# Patient Record
Sex: MALE | Race: WHITE | NOT HISPANIC OR LATINO | Employment: FULL TIME | URBAN - METROPOLITAN AREA
[De-identification: names, ages, dates, MRNs, and addresses within clinical notes are randomized per-mention and may not be internally consistent; named-entity substitution may affect disease eponyms.]

---

## 2023-03-08 LAB
CHOLESTEROL (MG/DL) IN SER/PLAS: 157 MG/DL (ref 0–199)
HEPATITIS B VIRUS SURFACE AB (MIU/ML) IN SERUM: 6.4 MIU/ML
HEPATITIS C VIRUS AB PRESENCE IN SERUM: NONREACTIVE
HIV 1/ 2 AG/AB SCREEN: NONREACTIVE

## 2023-07-07 ENCOUNTER — APPOINTMENT (OUTPATIENT)
Dept: LAB | Facility: LAB | Age: 58
End: 2023-07-07
Payer: COMMERCIAL

## 2023-07-07 LAB
ANION GAP IN SER/PLAS: 10 MMOL/L (ref 10–20)
BASOPHILS (10*3/UL) IN BLOOD BY AUTOMATED COUNT: 0.04 X10E9/L (ref 0–0.1)
BASOPHILS/100 LEUKOCYTES IN BLOOD BY AUTOMATED COUNT: 0.8 % (ref 0–2)
CALCIUM (MG/DL) IN SER/PLAS: 9.1 MG/DL (ref 8.6–10.6)
CARBON DIOXIDE, TOTAL (MMOL/L) IN SER/PLAS: 29 MMOL/L (ref 21–32)
CHLORIDE (MMOL/L) IN SER/PLAS: 107 MMOL/L (ref 98–107)
CREATININE (MG/DL) IN SER/PLAS: 0.98 MG/DL (ref 0.5–1.3)
EOSINOPHILS (10*3/UL) IN BLOOD BY AUTOMATED COUNT: 0.08 X10E9/L (ref 0–0.7)
EOSINOPHILS/100 LEUKOCYTES IN BLOOD BY AUTOMATED COUNT: 1.5 % (ref 0–6)
ERYTHROCYTE DISTRIBUTION WIDTH (RATIO) BY AUTOMATED COUNT: 14.5 % (ref 11.5–14.5)
ERYTHROCYTE MEAN CORPUSCULAR HEMOGLOBIN CONCENTRATION (G/DL) BY AUTOMATED: 32.5 G/DL (ref 32–36)
ERYTHROCYTE MEAN CORPUSCULAR VOLUME (FL) BY AUTOMATED COUNT: 91 FL (ref 80–100)
ERYTHROCYTES (10*6/UL) IN BLOOD BY AUTOMATED COUNT: 4.47 X10E12/L (ref 4.5–5.9)
GFR MALE: 89 ML/MIN/1.73M2
GLUCOSE (MG/DL) IN SER/PLAS: 102 MG/DL (ref 74–99)
HEMATOCRIT (%) IN BLOOD BY AUTOMATED COUNT: 40.9 % (ref 41–52)
HEMOGLOBIN (G/DL) IN BLOOD: 13.3 G/DL (ref 13.5–17.5)
IMMATURE GRANULOCYTES/100 LEUKOCYTES IN BLOOD BY AUTOMATED COUNT: 0.2 % (ref 0–0.9)
INR IN PPP BY COAGULATION ASSAY: 0.9 (ref 0.9–1.1)
LEUKOCYTES (10*3/UL) IN BLOOD BY AUTOMATED COUNT: 5.3 X10E9/L (ref 4.4–11.3)
LYMPHOCYTES (10*3/UL) IN BLOOD BY AUTOMATED COUNT: 1.31 X10E9/L (ref 1.2–4.8)
LYMPHOCYTES/100 LEUKOCYTES IN BLOOD BY AUTOMATED COUNT: 24.6 % (ref 13–44)
MONOCYTES (10*3/UL) IN BLOOD BY AUTOMATED COUNT: 0.56 X10E9/L (ref 0.1–1)
MONOCYTES/100 LEUKOCYTES IN BLOOD BY AUTOMATED COUNT: 10.5 % (ref 2–10)
NEUTROPHILS (10*3/UL) IN BLOOD BY AUTOMATED COUNT: 3.32 X10E9/L (ref 1.2–7.7)
NEUTROPHILS/100 LEUKOCYTES IN BLOOD BY AUTOMATED COUNT: 62.4 % (ref 40–80)
NRBC (PER 100 WBCS) BY AUTOMATED COUNT: 0 /100 WBC (ref 0–0)
PLATELETS (10*3/UL) IN BLOOD AUTOMATED COUNT: 182 X10E9/L (ref 150–450)
POTASSIUM (MMOL/L) IN SER/PLAS: 4.1 MMOL/L (ref 3.5–5.3)
PROTHROMBIN TIME (PT) IN PPP BY COAGULATION ASSAY: 10.1 SEC (ref 9.8–12.8)
SODIUM (MMOL/L) IN SER/PLAS: 142 MMOL/L (ref 136–145)
UREA NITROGEN (MG/DL) IN SER/PLAS: 17 MG/DL (ref 6–23)

## 2023-07-11 ENCOUNTER — HOSPITAL ENCOUNTER (OUTPATIENT)
Dept: DATA CONVERSION | Facility: HOSPITAL | Age: 58
End: 2023-07-11
Attending: ORTHOPAEDIC SURGERY | Admitting: ORTHOPAEDIC SURGERY
Payer: COMMERCIAL

## 2023-07-11 DIAGNOSIS — S52.021A DISPLACED FRACTURE OF OLECRANON PROCESS WITHOUT INTRAARTICULAR EXTENSION OF RIGHT ULNA, INITIAL ENCOUNTER FOR CLOSED FRACTURE: ICD-10-CM

## 2023-08-21 ENCOUNTER — HOSPITAL ENCOUNTER (OUTPATIENT)
Dept: DATA CONVERSION | Facility: HOSPITAL | Age: 58
End: 2023-08-21

## 2023-09-06 ENCOUNTER — HOSPITAL ENCOUNTER (OUTPATIENT)
Dept: DATA CONVERSION | Facility: HOSPITAL | Age: 58
End: 2023-09-06
Attending: SURGERY | Admitting: SURGERY
Payer: COMMERCIAL

## 2023-09-06 DIAGNOSIS — K40.90 UNILATERAL INGUINAL HERNIA, WITHOUT OBSTRUCTION OR GANGRENE, NOT SPECIFIED AS RECURRENT: ICD-10-CM

## 2023-09-26 ENCOUNTER — HOSPITAL ENCOUNTER (OUTPATIENT)
Dept: DATA CONVERSION | Facility: HOSPITAL | Age: 58
End: 2023-09-26
Payer: COMMERCIAL

## 2023-09-29 VITALS — BODY MASS INDEX: 28.02 KG/M2 | WEIGHT: 189.15 LBS | HEIGHT: 69 IN

## 2023-09-29 VITALS — WEIGHT: 188.49 LBS | BODY MASS INDEX: 27.92 KG/M2 | HEIGHT: 69 IN

## 2023-09-30 NOTE — H&P
History & Physical Reviewed:   I have reviewed the History and Physical dated:  08-Aug-2023   History and Physical reviewed and relevant findings noted. Patient examined to review pertinent physical  findings.: No significant changes   Home Medications Reviewed: no changes noted   Allergies Reviewed: no changes noted       ERAS (Enhanced Recovery After Surgery):  ·  ERAS Patient: no     Consent:   COVID-19 Consent:  ·  COVID-19 Risk Consent Surgeon has reviewed key risks related to the risk of sai COVID-19 and if they contract COVID-19 what the risks are.       Electronic Signatures:  Evita Johnson)  (Signed 06-Sep-2023 10:13)   Authored: History & Physical Reviewed, ERAS, Consent,  Note Completion      Last Updated: 06-Sep-2023 10:13 by Evita Johnson)

## 2023-09-30 NOTE — H&P
History & Physical Reviewed:   I have reviewed the History and Physical dated:  11-Jul-2023   History and Physical reviewed and relevant findings noted. Patient examined to review pertinent physical  findings.: No significant changes   Home Medications Reviewed: no changes noted   Allergies Reviewed: no changes noted       ERAS (Enhanced Recovery After Surgery):  ·  ERAS Patient: no     Consent:   COVID-19 Consent:  ·  COVID-19 Risk Consent Surgeon has reviewed key risks related to the risk of sai COVID-19 and if they contract COVID-19 what the risks are.     Attestation:   Note Completion:  I am a:  Resident/Fellow   Attending Attestation I saw and evaluated the patient.  I personally obtained the key and critical portions of the history and physical exam or was physically present for key and  critical portions performed by the resident/fellow. I reviewed the resident/fellow?s documentation and discussed the patient with the resident/fellow.  I agree with the resident/fellow?s medical decision making as documented in the note.     I personally evaluated the patient on 11-Jul-2023         Electronic Signatures:  Rodrigo Lomeli (Resident))  (Signed 11-Jul-2023 10:00)   Authored: History & Physical Reviewed, ERAS, Consent,  Note Completion  Moody Davis)  (Signed 11-Jul-2023 17:32)   Authored: Note Completion   Co-Signer: History & Physical Reviewed, ERAS, Consent, Note Completion      Last Updated: 11-Jul-2023 17:32 by Moody Davis)

## 2023-10-01 NOTE — OP NOTE
Post Operative Note:     PreOp Diagnosis: left inguinal hernia   Post-Procedure Diagnosis: same   Procedure: 1. robot assisted laparoscopic left inguinal  hernia repair   2. TAP block, bilateral   3.   4.   5.   Surgeon: Elizabeth   Resident/Fellow/Other Assistant:    Anesthesia: general   Estimated Blood Loss (mL): 20   Specimen: no   Complications: none   Findings: see below   Patient Returned To/Condition: PACU stable   Additional Details: Informed consent was obtained.  The patient was lying on the operating table supine. Subcutaneous lovenox was given. SCDs were placed. Intravenous antibiotics was given. After general anesthesia was administered, the patient was prepped and draped in the usual sterile fashion.  An epigastric  incision was carried down through the skin and subcutaneous tissue.  A 12 mm Shereen port was placed. Pneumoperitoneum was created by insufflating CO2 with pressure to 15 mmHg. A 8mm robot port was then placed through the Shereen port. Laparoscope was then  inserted.  There was a sizeable indirect left inguinal hernia. Two 8mm ports were placed on either side of the midline under direct vision at the level of camera port. We then docked the robot. The assistant stayed scrubbed at patient side. I then scrubbed  out and seated at the console to proceed with the procedure. The sigmoid colon was adherent to the sac. It was freed from the sac first. The peritoneum overlying the left groin was incised and reflected.  The hernia sac was dissected back from the inguinal  canal without injuring the surrounding structures.  It was reduced back into the preperitoneal space.  The remainder of the preperitoneal space was carefully created laterally to psoas muscle, medially beyond the radha?s ligament passing the level  of midline, inferiorly to the iliac vessels. The vas deferens was parietalized.  An extra large 3DMax Bard light mesh was then introduced through the port into the preperitoneal space and  placed into position nicely to cover the myopectineal orifice completely.  The mesh was secured to radha's ligament and abdominal wall on either side of inferior epigastric vessels with 2-0 vicryl. The peritoneum was closed with V Loc suture.  I then scrubbed back in. TAP block was then performed on both sides with 1% lidocaine  and 0.5% Marcaine 1:1 mixture.  The ports were then removed. The abdomen was deflated. The fascia at the Shereen port was closed with #0 Vicryl.  The subcutaneous tissue at each port site was then injected with 1% Lidocaine and 0.5% Marcaine mixture. The  skin was closed at each site with subcuticular 4-0 Vicryl.  Dermabond was applied.    The patient tolerated the procedure well. All counts were correct.     Both testicles were pulled down to scrotum at the conclusion of surgery.       Electronic Signatures:  Evita Johnson)  (Signed 06-Sep-2023 13:12)   Authored: Post Operative Note, Note Completion      Last Updated: 06-Sep-2023 13:12 by Evita Johnson)

## 2023-10-02 NOTE — OP NOTE
PROCEDURE DETAILS    Preoperative Diagnosis:  Closed, NVI R olecranon fracture  Postoperative Diagnosis:  Closed, NVI R olecranon fracture  Surgeon: Dr. Moody Davis  Resident/Fellow/Other Assistant: Niraj Granado, PGY5; Rodrigo Lomeli, PGY1    Procedure:  1. ORIF R olecranon with 22 modifier for increased case complexity  Anesthesia: GETA  Estimated Blood Loss: 50 ccs  Blood Replaced: None  Findings: Consistent with preoperative diagnosis  Specimens(s) Collected: no,     Complications: None  Drains and/or Catheters: None  Implants: Right synthes proximal ulna LCDC plate; 2.0 minifrag plate  Tourniquet Times: < 120 mins  Additional Details:   - NWB RUE in splint and sling  - Pain meds  - Bowel regimen  - Gradually restart home eliquis on POD1  - Discharge to home from PACU  - Follow up with Dr. Davis as scheduled in ~2 weeks  Patient Returned To/Condition: PACU in good condition        Operative Report:   INDICATIONS:  This is a left-handed  57-year-old male who presented to clinic with a closed NVI R olecranon fracture. Given the fracture's displacement, he was offered surgery in the form of open reduction internal fixation in order to promote healing of the fracture in appropriate alignment,  reduce the risk of arthritis and nonunion, and restore his extensor mechanism. The risks, benefits, and alternatives to surgery (including nonoperative management) were discussed at length with the patient, including but not limited to pain, infection,  bleeding, malunion, nonunion, implant issues, wound issues, stiffness, damage to surrounding structures, and the general risks of anaesthesia. The patient verbalized their understanding of these and confirmed their explicit desire to proceed. An informed  consent form was signed. The operative limb was marked.    Of note, this case had increased complexity due to his delayed presentation (~3 weeks after injury), noncompliance with preoperative immobilization, and the presence  of a loose osteochondral fragment on the ulnar border of the articular surface identified  intraoperatively.    PROCEDURE:  The patient was met in preop and agreeable to proceed. A preop huddle was performed. The patient was transported to the OR. A timeout procedure was performed. The patient, laterality, and procedure were again confirmed using two unique identifiers as  well as his preop imaging. All team members were in agreement.     GETA was induced uneventfully. IV abx were administered. The patient was transported to an OR table in the lateral position on a bean bag. All bony prominences were well padded. An axillary roll and bone foam were placed. The limb was prepped and draped  in typical fashion. A preop pause was performed.    A 10cm curvilinear incision was made over the posterior distal humerus and proximal ulna, curving toward the radius to avoid the direct tip of the olecranon skin and bursa. Skin bleeders were cauterized. The fracture hematoma was evacuated. We used electrocautery  to gently elevate the proximal flexor and extensor surfaces off the proximal ulna to allow for fixation. There was a nearly transverse oblique fracture through the olecranon. The anteromedial facet was intact, but there was a small 1cm x 5mm piece of  comminution off the ulnar aspect of the proximal fracture fragment. The fracture edges were cleaned with blunt dissection, a wood handle elevator, a pituitary, and a rongeur. There was an excellent read of the dorsal cortex. We therefore reduced the dorsal  cortex using two pointed reduction clamps, provisionally stabilized it with two K wires, and placed two 2.0 Synthes minifragment plates: one to capture the ulnar-sided comminution fragment, and one to stabilize the main fracture line. Although we were  happy with the cortical read there was still some gapping noted of the articular side of the fracture on fluoroscopy. We therefore decided to proceed with plate fixation  rather than a long intramedullary screw as planned.    One of the minifrag plates was removed. The tranverse plate was cut to be two-hole only, for stabilization of the comminuted segment. A lag screw was placed perpendicular to the fracture through the proximalmost aspect of the olecranon. The articular  side of the fracture was noted to compress some on fluoroscopic view. We selected and placed a Synthes LCDC right proximal ulnar plate. A longitudinal rent was made in the triceps tendon to allow the plate to sit on the bone. The plate was pinned in place  and compressed through the plate distally. We placed metaphyseal screws proximally to further reduce the plate to the bone. We then placed an additional compression screw in the distalmost hole. The cortical read dorsally was reconfirmed to be excellent  reduced. We then proceeded to place additional locking screws both proximally and distally.    At this point we were very satisfied with our alignment and fixation. Final fluoroscopy was saved. The elbow was taken through a full range of motion and noted to have no blocks to motion.    Being satisfied with our fixation, we irrigated all wounds and applied antibiotic powder. Hemostasis was confirmed. The distal musculature was closed over the plate using #1 vicryl. We then also used #1 vicryl to repair the rent in the triceps fascia.  This suture was passed through the plate and tied over the plate. We closed the skin with 2-0 vicryl and 2-0 nylon. A dry sterile dressing was applied. A long arm splint and sling were applied. GETA was reversed uneventfully.    POSTOP PLAN:  The patient was transported to PACU in a hospital bed in good condition. He will be NWB on his RUE. He will receive 6 weeks of Oral Vitamin D / Calcium and slowly restart his home eliquis. He will follow up with Dr. Davis in 2 weeks for wound check and  repeat films 3 views R elbow out of splint.      Dr. Moody Davis was present for the  critical portions of the procedure      Niraj Granado MD, dictating on behalf of Dr. Davis                        Attestation:   Note Completion:  Attending Attestation I was present for key portions of the procedure and the procedure lasted longer than 5 minutes.    I am a: Resident/Fellow         Electronic Signatures:  Niraj Granado (MD (Resident))  (Signed 11-Jul-2023 16:36)   Authored: Post-Operative Note, Chart Review, Note Completion  Moody Davis)  (Signed 11-Jul-2023 17:34)   Authored: Note Completion   Co-Signer: Post-Operative Note, Chart Review, Note Completion      Last Updated: 11-Jul-2023 17:34 by Moody Davis)

## 2023-10-03 ENCOUNTER — APPOINTMENT (OUTPATIENT)
Dept: OCCUPATIONAL THERAPY | Facility: CLINIC | Age: 58
End: 2023-10-03
Payer: COMMERCIAL

## 2023-10-05 ENCOUNTER — APPOINTMENT (OUTPATIENT)
Dept: OCCUPATIONAL THERAPY | Facility: CLINIC | Age: 58
End: 2023-10-05
Payer: COMMERCIAL

## 2023-10-23 PROBLEM — K40.90 LEFT INGUINAL HERNIA: Status: ACTIVE | Noted: 2023-10-23

## 2023-10-23 PROBLEM — I48.91 ATRIAL FIBRILLATION (MULTI): Status: ACTIVE | Noted: 2023-10-23

## 2023-10-23 PROBLEM — R29.818 SUSPECTED SLEEP APNEA: Status: ACTIVE | Noted: 2023-10-23

## 2023-10-23 RX ORDER — FLUOXETINE HYDROCHLORIDE 20 MG/1
20 CAPSULE ORAL DAILY
COMMUNITY
Start: 2023-03-17

## 2023-10-23 RX ORDER — METOPROLOL SUCCINATE 25 MG/1
25 TABLET, EXTENDED RELEASE ORAL DAILY
COMMUNITY
Start: 2023-08-10 | End: 2023-10-30 | Stop reason: SDUPTHER

## 2023-10-23 RX ORDER — MELOXICAM 15 MG/1
15 TABLET ORAL DAILY
COMMUNITY
Start: 2015-02-11

## 2023-10-23 RX ORDER — DILTIAZEM HYDROCHLORIDE 120 MG/1
120 TABLET, FILM COATED ORAL DAILY
COMMUNITY
Start: 2023-03-15

## 2023-10-23 RX ORDER — ESCITALOPRAM OXALATE 5 MG/1
5 TABLET ORAL DAILY
COMMUNITY
Start: 2023-04-06 | End: 2023-05-06 | Stop reason: WASHOUT

## 2023-10-23 RX ORDER — APIXABAN 5 MG/1
TABLET, FILM COATED ORAL
COMMUNITY
Start: 2023-07-28 | End: 2024-03-12 | Stop reason: SDUPTHER

## 2023-10-25 ENCOUNTER — CLINICAL SUPPORT (OUTPATIENT)
Dept: SLEEP MEDICINE | Facility: CLINIC | Age: 58
End: 2023-10-25
Payer: COMMERCIAL

## 2023-10-25 VITALS
HEIGHT: 69 IN | SYSTOLIC BLOOD PRESSURE: 117 MMHG | OXYGEN SATURATION: 100 % | RESPIRATION RATE: 22 BRPM | DIASTOLIC BLOOD PRESSURE: 78 MMHG | BODY MASS INDEX: 28.08 KG/M2 | WEIGHT: 189.6 LBS

## 2023-10-25 DIAGNOSIS — G47.31 PRIMARY CENTRAL SLEEP APNEA: ICD-10-CM

## 2023-10-25 DIAGNOSIS — G47.33 OBSTRUCTIVE SLEEP APNEA (ADULT) (PEDIATRIC): ICD-10-CM

## 2023-10-25 PROCEDURE — 95811 POLYSOM 6/>YRS CPAP 4/> PARM: CPT | Performed by: INTERNAL MEDICINE

## 2023-10-26 NOTE — PROGRESS NOTES
[facility] TECH NOTE:     Patient: Miguel Cordova   MRN//AGE: [id number]  1965  57 y.o.   Technologist: Antonio Mata   Room: 3   Service Date: 10/25/2023        Sleep Testing Location: [facility]  [address]ck [address] 42cm  [place]: 9  TECHNOLOGIST SLEEP STUDY PROCEDURE NOTE:   This sleep study is being conducted according to the policies and procedures outlined by the AAS accreditation standards.  The sleep study procedure and processes involved during this appointment was explained to the patient/patient’s family, questions were answered. The patient/family verbalized understanding.      The patient is a 57 y.o. year old male scheduled for a Diagnostic PSG Split night with montage of:  default . he arrived for his appointment.      The study that was ultimately completed was a Diagnostic PSG Split night with montage of:  default .    The full study Was completed.  Patient questionnaires completed?: yes     Consents signed? yes    Initial Fall Risk Screening:     Miguel has fallen in the last 6 months. his did not result in injury. Miguel does not have a fear of falling. He does not need assistance with sitting, standing, or walking. he does not need assistance walking in his home. he does not need assistance in an unfamiliar setting. The patient is notusing an assistive device.     Brief Study observations: This is a 57 year-old male here for a PSG-Split Night Study.  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 CPAP was initiate at 2351 EP#342. The patient did meet split-night criteria of AHI > 15 with at least two hours of sleep prior to 0300.  Bi-PAP was imitated at 0336 EP#772 due to the preponderance of central events. A chin strap was placed on the patient due to open mouth breathing/leak. The patient could benefit from the use of a full face mask due to open mouth breathing/leak even with a chin strap.    Deviation to order/protocol and reason: no      If  PAP, which was preferred mask/pressure/mode: Toñito Erickson under the nose nasal mask size medium      Other: no    After the procedure, the patient/family was informed to ensure followup with ordering clinician for testing results.      Technologist: Antonio Mata

## 2023-10-27 ENCOUNTER — TELEPHONE (OUTPATIENT)
Dept: CARDIOLOGY | Facility: CLINIC | Age: 58
End: 2023-10-27
Payer: COMMERCIAL

## 2023-10-27 DIAGNOSIS — G47.33 OBSTRUCTIVE SLEEP APNEA SYNDROME, SEVERE: Primary | ICD-10-CM

## 2023-10-27 NOTE — TELEPHONE ENCOUNTER
Patient called back regarding sleep study results. I clarified the number that he needed to call to be scheduled for sleep medicine. I also reminded him of his follow up visit with Tamika. Pt verbalized understanding.

## 2023-10-27 NOTE — TELEPHONE ENCOUNTER
Left message for patient letting him know to schedule with sleep medicine ASAP. I also reminded him to attend his follow up with Tamika on 11/16/23 @ 11Am

## 2023-10-27 NOTE — TELEPHONE ENCOUNTER
----- Message from SELWYN Bustos sent at 10/27/2023  1:28 PM EDT -----  Please have patient call and schedule with sleep medicine ASAP [phone] and can you make sure he has a follow up with me?    I will put referral in for sleep medicine.

## 2023-10-30 ENCOUNTER — OFFICE VISIT (OUTPATIENT)
Dept: SLEEP MEDICINE | Facility: CLINIC | Age: 58
End: 2023-10-30
Payer: COMMERCIAL

## 2023-10-30 VITALS
OXYGEN SATURATION: 94 % | DIASTOLIC BLOOD PRESSURE: 76 MMHG | SYSTOLIC BLOOD PRESSURE: 130 MMHG | HEIGHT: 69 IN | BODY MASS INDEX: 27.4 KG/M2 | WEIGHT: 185 LBS | HEART RATE: 70 BPM

## 2023-10-30 DIAGNOSIS — I48.0 PAROXYSMAL ATRIAL FIBRILLATION (MULTI): Primary | ICD-10-CM

## 2023-10-30 DIAGNOSIS — I10 ESSENTIAL (PRIMARY) HYPERTENSION: ICD-10-CM

## 2023-10-30 DIAGNOSIS — I48.0 PAROXYSMAL ATRIAL FIBRILLATION (MULTI): ICD-10-CM

## 2023-10-30 DIAGNOSIS — G47.33 OBSTRUCTIVE SLEEP APNEA SYNDROME, SEVERE: ICD-10-CM

## 2023-10-30 DIAGNOSIS — G47.31 CENTRAL SLEEP APNEA: Primary | ICD-10-CM

## 2023-10-30 PROCEDURE — 3075F SYST BP GE 130 - 139MM HG: CPT | Performed by: PHYSICIAN ASSISTANT

## 2023-10-30 PROCEDURE — 3078F DIAST BP <80 MM HG: CPT | Performed by: PHYSICIAN ASSISTANT

## 2023-10-30 PROCEDURE — 99204 OFFICE O/P NEW MOD 45 MIN: CPT | Performed by: PHYSICIAN ASSISTANT

## 2023-10-30 PROCEDURE — 1036F TOBACCO NON-USER: CPT | Performed by: PHYSICIAN ASSISTANT

## 2023-10-30 NOTE — PATIENT INSTRUCTIONS
"  Thank you for coming to the Sleep Medicine Clinic today! Your sleep medicine provider today was: Juan Diego Suh PA-C Below is a summary of your treatment plan, other important information, and our contact numbers:      TREATMENT PLAN     Ordering the machine for you from [facility]    Follow-up Appointment:   31-90 days after starting machine      IMPORTANT PHONE NUMBERS     Sleep Medicine Clinic Fax: [phone]  Appointments (for Adult Sleep Clinic): [phone] - option 2  Appointments (For Sleep Studies): [phone] - option 3  Behavioral Sleep Medicine: [phone]    [employer] ([place]): [phone]  For clinical questions and refilling prescriptions: [phone]  Joan Ray (For Bab[place]/Angeli): P: [phone]  F: [phone]       CONTACTING YOUR SLEEP MEDICINE PROVIDER     Send a message directly to your provider through \"My Chart\", which is the email service through your  Records Account: [email or web address]   Call [phone] and leave a message. One of the administrative assistants will forward the message to your sleep medicine provider through \"My Chart\" and/or email.     Your sleep medicine provider for this visit was: Juan Diego Suh PA-C       "

## 2023-10-30 NOTE — PROGRESS NOTES
[facility]  New Visit Note        HISTORY OF PRESENT ILLNESS     The patient's referring provider is: Tamika Jackson, APR*    HISTORY OF PRESENT ILLNESS   Miguel Cordova is a 57 y.o. male who presents to a [facility] for a sleep medicine evaluation with concerns of Sleep Apnea and Review Sleep Study Results.     PAST SLEEP HISTORY    Patient has the following sleep-related diagnoses: obstructive/central sleep apnea  Prior sleep study results: severe sleep apnea with obstructive and central events - AHI 68.9 KARLO 20    CURRENT HISTORY    On today's visit, the patient reports symptoms of snoring, stopping breathing at night, waking from sleep. He has not had testing for ANIKA prior to most recent 10/25/2023 test.    10/25/2023 test showed severe sleep apnea with both obstructive and central sleep apnea events. On CPAP up to pressure of 10 cm H2O and was changed to BiPAP ST without resolution of central apnea events. EF was 65-70% according to echocardiogram 8/16/2023.    Has had ANIKA symptoms for a long time.    Afib started at his sons wedding several weeks ago. He is no longer drinking caffeine. He thinks anxiety may have triggered Afib.    He is on metoprolol for Afib and HTN.    STOP  4  BANG 2  ESS     7    Sleep schedule   Usually goes to sleep after midnight - no further information provided    Naps:   rarely naps    Preferred sleeping position: supine    Sleep-related ROS:    Sleep Initiation: no problems going to sleep  Problems going to sleep associated with: No problems going to sleep    Sleep Maintenance: easily returns to sleep after awakening and wakes up about several times per night  Problems staying asleep associated with: Problems Staying Asleep: no clear reason    Breathing during sleep: snoring and witnessed apneas    RLS screen:  denies    Sleep-related behaviors:  denies    Daytime Symptoms  On awakening patient reports: rarely  "headaches and sore throat    Patient report some daytime symptoms including: daytime sleepiness    ESS: No data recorded   JOSE: 12  FOSQ:  35      REVIEW OF SYSTEMS     All other systems negative      ALLERGIES AND MEDICATIONS     ALLERGIES  No Known Allergies    MEDICATIONS  Current Outpatient Medications   Medication Sig Dispense Refill    acetaminophen (TYLENOL 8 HOUR ORAL) Take by mouth if needed.      cyclobenzaprine (Flexeril) 5 mg tablet TAKE 1 TABLET BY MOUTH THREE TIMES A DAY 15 tablet 0    dilTIAZem (Cardizem) 120 mg immediate release tablet Take 1 tablet (120 mg) by mouth once daily. as directed      docusate sodium (Colace) 100 mg capsule TAKE 1 CAPSULE BY MOUTH TWO TIMES A DAY. HOLD FOR LOOSE STOOL 10 capsule 1    Eliquis 5 mg tablet       FLUoxetine (PROzac) 20 mg capsule Take 1 capsule (20 mg) by mouth once daily.      HYDROcodone-acetaminophen (Norco) 5-325 mg tablet TAKE 1 TABLET BY MOUTH EVERY 4 HOURS FOR 3 DAYS AS NEEDED FOR PAIN 18 tablet 0    meloxicam (Mobic) 15 mg tablet Take 1 tablet (15 mg) by mouth once daily.      metoprolol succinate XL (Toprol-XL) 25 mg 24 hr tablet Take 1 tablet (25 mg) by mouth once daily.       No current facility-administered medications for this visit.         PAST HISTORY     PAST MEDICAL HISTORY  He  has no past medical history on file.    PAST SURGICAL HISTORY:  History reviewed. No pertinent surgical history.    FAMILY HISTORY  No family history on file.    SOCIAL HISTORY  He  reports that he has never smoked. He has never used smokeless tobacco. He reports that he does not currently use alcohol. He reports that he does not use drugs. He currently lives alone.     Caffeine consumption:  none  Alcohol consumption: 3 times per week  Smoking: never  Marijuana: yes      PHYSICAL EXAM     VITAL SIGNS: /76   Pulse 70   Ht 1.753 m (5' 9\")   Wt 83.9 kg (185 lb)   SpO2 94%   BMI 27.32 kg/m²      CURRENT WEIGHT: [unfilled]  BMI: [unfilled]  PREVIOUS WEIGHTS:  Wt " Readings from Last 3 Encounters:   10/30/23 83.9 kg (185 lb)   10/25/23 86 kg (189 lb 9.5 oz)       PHYSICAL EXAM: GENERAL: alert oriented x 3 pleasant and cooperative no acute distress  MODIFIED WEBSTER SCORE: 1+  MODIFIED MALLAMPATI SCORE: 1+  LATERAL PHARYNGEAL WALL: 2+  TONSILLAR SCORE: 1+  OVERJET NOTED  UVULA: midline  TONGUE SCALLOPING: normal  NECK EXAM: normal supple no adenopathy      ASSESSMENT/PLAN     Mr. Cordova is a 57 y.o. male and He was referred to the [facility] for the following issues:    OBSTRUCTIVE & CENTRAL SLEEP APNEA, SEVERE  -Reviewed test results with patient in detail  -recommended titration with ASV but he wishes to try machine first  -Will order new ASV from [facility] with EPAP 5-15, PS 0-10 (echocardiogram 8/2023 showed normal EF)  -Discussed mask options and common tolerance issues - wants to start with P10 mask  -Goal of CPAP includes less daytime sleepiness, less waking, lower risk of Afib, reducing BP  -Followup 31-90 days after starting CPAP    HYPERTENSION  -/76 today  -Will likely benefit from positive airway pressure    AFIB  -Followed by cardiology  -Discussed treating ANIKA/CSA will reduce risk of afib    Followup 31-90 days after starting CPAP

## 2023-11-01 RX ORDER — METOPROLOL SUCCINATE 25 MG/1
25 TABLET, EXTENDED RELEASE ORAL DAILY
Qty: 90 TABLET | Refills: 3 | Status: SHIPPED | OUTPATIENT
Start: 2023-11-01 | End: 2024-03-12 | Stop reason: SDUPTHER

## 2023-11-15 ENCOUNTER — APPOINTMENT (OUTPATIENT)
Dept: ORTHOPEDIC SURGERY | Facility: CLINIC | Age: 58
End: 2023-11-15
Payer: COMMERCIAL

## 2023-11-15 DIAGNOSIS — G47.31 CENTRAL SLEEP APNEA: Primary | ICD-10-CM

## 2023-11-15 DIAGNOSIS — G47.33 OBSTRUCTIVE SLEEP APNEA: ICD-10-CM

## 2023-11-16 ENCOUNTER — APPOINTMENT (OUTPATIENT)
Dept: CARDIOLOGY | Facility: CLINIC | Age: 58
End: 2023-11-16
Payer: COMMERCIAL

## 2024-03-12 DIAGNOSIS — I48.0 PAROXYSMAL ATRIAL FIBRILLATION (MULTI): Primary | ICD-10-CM

## 2024-03-13 RX ORDER — METOPROLOL SUCCINATE 25 MG/1
25 TABLET, EXTENDED RELEASE ORAL DAILY
Qty: 90 TABLET | Refills: 3 | Status: SHIPPED | OUTPATIENT
Start: 2024-03-13 | End: 2024-05-14 | Stop reason: DRUGHIGH

## 2024-03-13 RX ORDER — APIXABAN 5 MG/1
5 TABLET, FILM COATED ORAL 2 TIMES DAILY
Qty: 90 TABLET | Refills: 3 | Status: SHIPPED | OUTPATIENT
Start: 2024-03-13 | End: 2024-05-28 | Stop reason: SDUPTHER

## 2024-04-02 ENCOUNTER — ANCILLARY PROCEDURE (OUTPATIENT)
Dept: CARDIOLOGY | Facility: CLINIC | Age: 59
End: 2024-04-02
Payer: COMMERCIAL

## 2024-04-02 ENCOUNTER — OFFICE VISIT (OUTPATIENT)
Dept: CARDIOLOGY | Facility: CLINIC | Age: 59
End: 2024-04-02
Payer: COMMERCIAL

## 2024-04-02 VITALS
DIASTOLIC BLOOD PRESSURE: 68 MMHG | HEART RATE: 74 BPM | HEIGHT: 69 IN | BODY MASS INDEX: 28.44 KG/M2 | WEIGHT: 192 LBS | OXYGEN SATURATION: 96 % | SYSTOLIC BLOOD PRESSURE: 118 MMHG

## 2024-04-02 DIAGNOSIS — I48.91 ATRIAL FIBRILLATION, UNSPECIFIED TYPE (MULTI): ICD-10-CM

## 2024-04-02 DIAGNOSIS — I48.91 ATRIAL FIBRILLATION, UNSPECIFIED TYPE (MULTI): Primary | ICD-10-CM

## 2024-04-02 PROCEDURE — 1036F TOBACCO NON-USER: CPT

## 2024-04-02 PROCEDURE — 99214 OFFICE O/P EST MOD 30 MIN: CPT

## 2024-04-02 PROCEDURE — 93248 EXT ECG>7D<15D REV&INTERPJ: CPT | Performed by: INTERNAL MEDICINE

## 2024-04-02 PROCEDURE — 93246 EXT ECG>7D<15D RECORDING: CPT

## 2024-04-02 NOTE — PROGRESS NOTES
"Chief Complaint: FUV    HPI:  57 year old male  with past history of ADHD, new onset atrial fibrillation 4/2023, ANIKA (CPAP),  occasional marijuana smoker .    Mr. Cordova was evaluated 8/18/2023 for new onset atrial fibrillation. He was ordered a Holter Monitor and wore it for 2 weeks . However, never mailed it back.  Recently he has noticed \" heart going out of rhythm\" particularly when he drinks ETOH and when drinks caffeine. It is associated with SOB. He also has noticed significant fatigue.   Patient denies chest pain and angina.  Pt denies shortness of breath, dyspnea on exertion, orthopnea, and paroxysmal nocturnal dyspnea.  Pt denies worsening lower extremity edema.  Pt denies palpitations or syncope.  No recent falls.  No fever or chills.  No cough.  No change in bowel or bladder habits.  No sick contacts.  No recent travel.    Past Medical History:  As above.    Past Surgical History:  Hernia repair 2023      Social History:  He reports that he has never smoked. He has never used smokeless tobacco. He reports that he does not currently use alcohol. He reports current drug use. Drug: Marijuana.    Family History:  Cousin heart disease, father heart disease (heavy smoker).  Allergies:  Patient has no known allergies.    Outpatient Medications:  Current Outpatient Medications   Medication Instructions    acetaminophen (TYLENOL 8 HOUR ORAL) oral, As needed    cyclobenzaprine (Flexeril) 5 mg tablet TAKE 1 TABLET BY MOUTH THREE TIMES A DAY    dilTIAZem (CARDIZEM) 120 mg, oral, Daily, as directed    docusate sodium (Colace) 100 mg capsule TAKE 1 CAPSULE BY MOUTH TWO TIMES A DAY. HOLD FOR LOOSE STOOL    Eliquis 5 mg, oral, 2 times daily    FLUoxetine (PROZAC) 20 mg, oral, Daily    meloxicam (MOBIC) 15 mg, oral, Daily    metoprolol succinate XL (TOPROL-XL) 25 mg, oral, Daily       Physical Exam  Constitutional:       Appearance: Normal appearance.   Neck:      Vascular: No carotid bruit. "   Cardiovascular:      Rate and Rhythm: Normal rate and regular rhythm.      Pulses: Normal pulses.      Heart sounds: Normal heart sounds.      No gallop.   Pulmonary:      Effort: Pulmonary effort is normal.      Breath sounds: Normal breath sounds.   Abdominal:      Palpations: Abdomen is soft.   Musculoskeletal:      Cervical back: Neck supple.   Skin:     General: Skin is warm.      Capillary Refill: Capillary refill takes less than 2 seconds.   Neurological:      General: No focal deficit present.      Mental Status: He is alert and oriented to person, place, and time.   Psychiatric:         Mood and Affect: Mood normal.        Last Labs:  CBC -  Lab Results   Component Value Date    WBC 5.0 08/31/2023    HGB 15.3 08/31/2023    HCT 46.8 08/31/2023    MCV 91 08/31/2023     08/31/2023       CMP -  Lab Results   Component Value Date    CALCIUM 9.6 08/31/2023    PROT 7.2 02/22/2023    ALBUMIN 4.4 02/22/2023    AST 25 02/22/2023    ALT 25 02/22/2023    ALKPHOS 82 02/22/2023    BILITOT 0.4 02/22/2023       LIPID PANEL -   Lab Results   Component Value Date    CHOL 157 03/08/2023       RENAL FUNCTION PANEL -   Lab Results   Component Value Date    GLUCOSE 83 08/31/2023     08/31/2023    K 4.1 08/31/2023     08/31/2023    CO2 29 08/31/2023    ANIONGAP 11 08/31/2023    BUN 17 08/31/2023    CREATININE 0.93 08/31/2023    GFRMALE >90 08/31/2023    CALCIUM 9.6 08/31/2023    ALBUMIN 4.4 02/22/2023        Lab Results   Component Value Date    BNP 28 02/22/2023       Last Cardiology Tests:  ECG:  Normal sinus rhythm  Normal ECG  No previous ECGs available  Confirmed by Neel Ramos ([id number]) on 8/11/2023 11:02:32 AM  Echo: 8/16/2023  CONCLUSIONS:  1. Left ventricular systolic function is normal with a 55-60% estimated ejection fraction.    Assessment/Plan   57 year old male  with past history of ADHD, new onset atrial fibrillation 4/2023, ANIKA (CPAP),  occasional marijuana smoker .      "Art was evaluated 8/18/2023 for new onset atrial fibrillation. He was ordered a Holter Monitor and wore it for 2 weeks . However, never mailed it back.  Recently he has noticed \" heart going out of rhythm\" particularly when he drinks ETOH and when drinks caffeine. It is associated with SOB. He also has noticed significant fatigue. Today he is normotensive.     Will have him wear Holter monitor for 2 weeks and follow up virtually    Tamika Jackson, MARLENE-CNP  "

## 2024-04-02 NOTE — PATIENT INSTRUCTIONS
Angel,    Follow up with sleep study    [phone]    2 week Holter Monitor    Tamika ROSARIO-CNP

## 2024-05-08 ENCOUNTER — OFFICE VISIT (OUTPATIENT)
Dept: CARDIOLOGY | Facility: CLINIC | Age: 59
End: 2024-05-08
Payer: COMMERCIAL

## 2024-05-08 VITALS
OXYGEN SATURATION: 96 % | SYSTOLIC BLOOD PRESSURE: 124 MMHG | HEIGHT: 69 IN | DIASTOLIC BLOOD PRESSURE: 78 MMHG | HEART RATE: 71 BPM | WEIGHT: 193 LBS | BODY MASS INDEX: 28.58 KG/M2

## 2024-05-08 DIAGNOSIS — I47.10 SVT (SUPRAVENTRICULAR TACHYCARDIA) (CMS-HCC): ICD-10-CM

## 2024-05-08 DIAGNOSIS — I48.0 PAROXYSMAL ATRIAL FIBRILLATION (MULTI): ICD-10-CM

## 2024-05-08 DIAGNOSIS — R06.02 SHORTNESS OF BREATH: Primary | ICD-10-CM

## 2024-05-08 DIAGNOSIS — E78.5 HYPERLIPIDEMIA, UNSPECIFIED HYPERLIPIDEMIA TYPE: ICD-10-CM

## 2024-05-08 DIAGNOSIS — G47.33 OSA (OBSTRUCTIVE SLEEP APNEA): ICD-10-CM

## 2024-05-08 PROCEDURE — 99214 OFFICE O/P EST MOD 30 MIN: CPT

## 2024-05-08 PROCEDURE — 1036F TOBACCO NON-USER: CPT

## 2024-05-08 ASSESSMENT — ENCOUNTER SYMPTOMS: PALPITATIONS: 1

## 2024-05-08 NOTE — PATIENT INSTRUCTIONS
MrEduardo Cordova,    Increase Metoprolol Succinate 50 mg daily  Take Eliquis twice daily   Schedule with Sleep Medicine [phone] Juan Diego Suh PA-C   4.   Schedule with Electrophysiology  5.  Follow up with General Cardiology in 4 months  6. Fasting 12 hour for Cholesterol   7. Exercise Stress Echocardiogram ( avoid caffeine 12 hours before )

## 2024-05-08 NOTE — PROGRESS NOTES
"Chief Complaint:   Follow-up     History Of Present Illness:    57 year old male  with past history of ADHD, new onset atrial fibrillation 4/2023, ANIKA (CPAP),  occasional marijuana smoker .      He has decreased the amount caffeine.. He reports that the caffeine helps him focus better and is not able to eliminate it. However, since decreasing it he reports that he does feel less episodes. Nevertheless, yesterday he reports significant episode of rapid HR racing and the feeling of almost passing out. Today we discussed Holter Monitor results.  Patient denies chest pain and angina.  Pt denies shortness of breath, dyspnea on exertion, orthopnea, and paroxysmal nocturnal dyspnea.  Pt denies worsening lower extremity edema.  Pt denies syncope.  No recent falls.  No fever or chills.  No cough.  No change in bowel or bladder habits.  No sick contacts.  No recent travel.    Review of Systems   Cardiovascular:  Positive for palpitations.   All other systems reviewed and are negative.    Last Recorded Vitals:  Vitals:    05/08/24 1258   BP: 124/78   Pulse: 71   SpO2: 96%   Weight: 87.5 kg (193 lb)   Height: 1.753 m (5' 9\")       Past Medical History:  He has no past medical history on file.    Past Surgical History:  He has no past surgical history on file.      Social History:  He reports that he has quit smoking. His smoking use included cigarettes. He has never used smokeless tobacco. He reports that he does not currently use alcohol. He reports current drug use. Drug: Marijuana.    Family History:  No family history on file.     Allergies:  Amoxicillin-pot clavulanate    Outpatient Medications:  Current Outpatient Medications   Medication Instructions    acetaminophen (TYLENOL 8 HOUR ORAL) oral, As needed    cyclobenzaprine (Flexeril) 5 mg tablet TAKE 1 TABLET BY MOUTH THREE TIMES A DAY    dilTIAZem (CARDIZEM) 120 mg, oral, Daily, as directed    docusate sodium (Colace) 100 mg capsule TAKE 1 CAPSULE BY " MOUTH TWO TIMES A DAY. HOLD FOR LOOSE STOOL    Eliquis 5 mg, oral, 2 times daily    FLUoxetine (PROZAC) 20 mg, oral, Daily    meloxicam (MOBIC) 15 mg, oral, Daily    metoprolol succinate XL (TOPROL-XL) 25 mg, oral, Daily       Physical Exam  Constitutional:       Appearance: Normal appearance.   Neck:      Vascular: No carotid bruit.   Cardiovascular:      Rate and Rhythm: Normal rate and regular rhythm.      Pulses: Normal pulses.      Heart sounds: Normal heart sounds.      No gallop.   Pulmonary:      Effort: Pulmonary effort is normal.      Breath sounds: Normal breath sounds.   Abdominal:      Palpations: Abdomen is soft.   Musculoskeletal:      Cervical back: Neck supple.   Skin:     General: Skin is warm.      Capillary Refill: Capillary refill takes less than 2 seconds.   Neurological:      General: No focal deficit present.      Mental Status: He is alert and oriented to person, place, and time.   Psychiatric:         Mood and Affect: Mood normal.       Last Labs:  CBC -  Lab Results   Component Value Date    WBC 5.0 08/31/2023    HGB 15.3 08/31/2023    HCT 46.8 08/31/2023    MCV 91 08/31/2023     08/31/2023       CMP -  Lab Results   Component Value Date    CALCIUM 9.6 08/31/2023    PROT 7.2 02/22/2023    ALBUMIN 4.4 02/22/2023    AST 25 02/22/2023    ALT 25 02/22/2023    ALKPHOS 82 02/22/2023    BILITOT 0.4 02/22/2023       LIPID PANEL -   Lab Results   Component Value Date    CHOL 157 03/08/2023       RENAL FUNCTION PANEL -   Lab Results   Component Value Date    GLUCOSE 83 08/31/2023     08/31/2023    K 4.1 08/31/2023     08/31/2023    CO2 29 08/31/2023    ANIONGAP 11 08/31/2023    BUN 17 08/31/2023    CREATININE 0.93 08/31/2023    GFRMALE >90 08/31/2023    CALCIUM 9.6 08/31/2023    ALBUMIN 4.4 02/22/2023        Lab Results   Component Value Date    BNP 28 02/22/2023       Last Cardiology Tests:  ECG:  Normal sinus rhythm  Normal ECG  No previous ECGs available  Confirmed by Cireddu,  Neel ([id number]) on 8/11/2023 11:02:32 AM  Echo:8/16/2023  CONCLUSIONS:  1. Left ventricular systolic function is normal with a 55-60% estimated ejection fraction.    Holter Monitor results: The predominant rhythm was sinus with study included one 3.3-second pause.  He had 9 occurrences of atrial tachycardia with the fastest episode being 259 bpm and the longest episode 8 beats.  There were 173 occurrences of supraventricular tachycardia with the fastest episode 265 bpm and the longest episode 11 beats.  The study included A-fib/flutter burden of 15% with 79% in RVR and 4% and SVR.  The longest episode was 18 hours 59 minutes and the fastest episode was 231 bpm.    Assessment/Plan   57 year old male  with past history of ADHD, new onset atrial fibrillation 4/2023, ANIKA (CPAP),  occasional marijuana smoker .      He has decreased the amount caffeine.. He reports that the caffeine helps him focus better and is not able to eliminate it. However, since decreasing it he reports that he does feel less episodes. Nevertheless, yesterday he reports significant episode of rapid HR racing and the feeling of almost passing out. During this episode he was SOB. Today we discussed Holter Monitor results which showed 173 occurrences of SVT longest being 11 beats 1 pause of 3 seconds was noted, A-fib with a burden of 15% with 79% of RVR longest episode was 18 hours.    -We discussed the importance of compliance with CPAP.  However he has not yet purchased a CPAP he has been using a friend's CPAP.  He was advised to follow-up with sleep medicine.  Given the frequent episodes of SVT we will have him increase beta-blocker to 50 mg daily.    - Exercise Stress Echocardiogram    - Continue taking Eliquis 5 milligrams 1 tab twice daily.    -Fasting lipid panel    -Follow-up with general cardiology in 4 months    -Schedule with EP for possible RFA      Tamika Jackson, MARLENE-CNP

## 2024-05-09 LAB — BODY SURFACE AREA: 2.06 M2

## 2024-05-14 ENCOUNTER — LAB (OUTPATIENT)
Dept: LAB | Facility: LAB | Age: 59
End: 2024-05-14
Payer: COMMERCIAL

## 2024-05-14 DIAGNOSIS — E78.5 HYPERLIPIDEMIA, UNSPECIFIED HYPERLIPIDEMIA TYPE: ICD-10-CM

## 2024-05-14 DIAGNOSIS — I48.0 PAROXYSMAL ATRIAL FIBRILLATION (MULTI): ICD-10-CM

## 2024-05-14 LAB
CHOLEST SERPL-MCNC: 200 MG/DL (ref 133–200)
CHOLEST/HDLC SERPL: 4.3 {RATIO}
HDLC SERPL-MCNC: 47 MG/DL
LDLC SERPL CALC-MCNC: 137 MG/DL (ref 65–130)
TRIGL SERPL-MCNC: 82 MG/DL (ref 40–150)

## 2024-05-14 PROCEDURE — 80061 LIPID PANEL: CPT

## 2024-05-14 PROCEDURE — 36415 COLL VENOUS BLD VENIPUNCTURE: CPT

## 2024-05-14 RX ORDER — METOPROLOL SUCCINATE 50 MG/1
50 TABLET, EXTENDED RELEASE ORAL DAILY
Qty: 90 TABLET | Refills: 3 | Status: SHIPPED | OUTPATIENT
Start: 2024-05-14 | End: 2024-05-14 | Stop reason: SDUPTHER

## 2024-05-28 ENCOUNTER — TELEMEDICINE (OUTPATIENT)
Dept: CARDIOLOGY | Facility: CLINIC | Age: 59
End: 2024-05-28
Payer: COMMERCIAL

## 2024-05-28 DIAGNOSIS — E78.5 HYPERLIPIDEMIA, UNSPECIFIED HYPERLIPIDEMIA TYPE: ICD-10-CM

## 2024-05-28 DIAGNOSIS — I48.0 PAROXYSMAL ATRIAL FIBRILLATION (MULTI): Primary | ICD-10-CM

## 2024-05-28 PROCEDURE — 99213 OFFICE O/P EST LOW 20 MIN: CPT

## 2024-05-28 RX ORDER — ATORVASTATIN CALCIUM 20 MG/1
20 TABLET, FILM COATED ORAL DAILY
Qty: 30 TABLET | Refills: 11 | Status: SHIPPED | OUTPATIENT
Start: 2024-05-28 | End: 2025-05-28

## 2024-05-28 RX ORDER — APIXABAN 5 MG/1
5 TABLET, FILM COATED ORAL 2 TIMES DAILY
Qty: 90 TABLET | Refills: 3 | Status: SHIPPED | OUTPATIENT
Start: 2024-05-28

## 2024-05-28 NOTE — PROGRESS NOTES
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 atrial fibrillation/ Aflutter 4/2023 15 % burden, ANIKA (CPAP), occasional marijuana smoker .     Reports that heart rate has felt better in regards to heart rate since increasing metoprolol. He has not reached out to ANIKA- Provider. He was encouraged to do so ASAP.  Patient denies chest pain and angina.  Pt denies shortness of breath, dyspnea on exertion, orthopnea, and paroxysmal nocturnal dyspnea.  Pt denies worsening lower extremity edema.  Pt denies palpitations or syncope.  No recent falls.  No fever or chills.  No cough.  No change in bowel or bladder habits.  No sick contacts.  No recent trave    Review of Systems   All other systems reviewed and are negative.    Past Medical History:  He has no past medical history on file.    Past Surgical History:  He has no past surgical history on file.      Social History:  He reports that he has quit smoking. His smoking use included cigarettes. He has never used smokeless tobacco. He reports that he does not currently use alcohol. He reports current drug use. Drug: Marijuana.    Family History:  No family history on file.     Allergies:  Amoxicillin-pot clavulanate    Outpatient Medications:  Current Outpatient Medications   Medication Instructions    acetaminophen (TYLENOL 8 HOUR ORAL) oral, As needed    cyclobenzaprine (Flexeril) 5 mg tablet TAKE 1 TABLET BY MOUTH THREE TIMES A DAY    dilTIAZem (CARDIZEM) 120 mg, oral, Daily, as directed    docusate sodium (Colace) 100 mg capsule TAKE 1 CAPSULE BY MOUTH TWO TIMES A DAY. HOLD FOR LOOSE STOOL    Eliquis 5 mg, oral, 2 times daily    FLUoxetine (PROZAC) 20 mg, oral, Daily    meloxicam (MOBIC) 15 mg, oral, Daily    metoprolol succinate XL (TOPROL-XL) 50 mg, oral, Daily     Physical examination performed via telemedicine encounter:  General: awake, alert, non-diaphoretic, no  psychomotor agitation, no acute distress.  Head: atraumatic, normocephalic, no rashes or lesions noted.  Eyes: no redness, discharge, swelling, or lesions.  Nose: no redness, swelling, discharge, deformity, or impetigo/crusting.  Skin: no lesions, wounds, erythema, or cyanosis noted on face or hands.  Cardiopulmonary: no increased respiratory effort, speaking in clear sentences, inspiratory to expiratory ratio within normal limits.  Musculoskeletal: normal range of motion of neck, upper extremities, and hands with no obvious synovitis.  Neurologic: cranial nerves grossly normal, speech normal rate and rhythm, moved both upper extremities equally.  Psychiatric: normal appearance, normal behavior, and normal attitude; well groomed, pleasant, cooperative.     Last Labs:  CBC -  Lab Results   Component Value Date    WBC 5.0 08/31/2023    HGB 15.3 08/31/2023    HCT 46.8 08/31/2023    MCV 91 08/31/2023     08/31/2023       CMP -  Lab Results   Component Value Date    CALCIUM 9.6 08/31/2023    PROT 7.2 02/22/2023    ALBUMIN 4.4 02/22/2023    AST 25 02/22/2023    ALT 25 02/22/2023    ALKPHOS 82 02/22/2023    BILITOT 0.4 02/22/2023       LIPID PANEL -   Lab Results   Component Value Date    CHOL 200 05/14/2024    TRIG 82 05/14/2024    HDL 47.0 05/14/2024    CHHDL 4.3 05/14/2024       RENAL FUNCTION PANEL -   Lab Results   Component Value Date    GLUCOSE 83 08/31/2023     08/31/2023    K 4.1 08/31/2023     08/31/2023    CO2 29 08/31/2023    ANIONGAP 11 08/31/2023    BUN 17 08/31/2023    CREATININE 0.93 08/31/2023    GFRMALE >90 08/31/2023    CALCIUM 9.6 08/31/2023    ALBUMIN 4.4 02/22/2023        Lab Results   Component Value Date    BNP 28 02/22/2023     Last Cardiology Tests:  ECG:  Normal sinus rhythm  Normal ECG  No previous ECGs available  Confirmed by Neel Ramos ([id number]) on 8/11/2023 11:02:32 AM  Echo:8/16/2023  CONCLUSIONS:  1. Left ventricular systolic function is normal with a 55-60% estimated  ejection fraction.     2023 Holter Monitor results: The predominant rhythm was sinus with study included one 3.3-second pause.  He had 9 occurrences of atrial tachycardia with the fastest episode being 259 bpm and the longest episode 8 beats.  There were 173 occurrences of supraventricular tachycardia with the fastest episode 265 bpm and the longest episode 11 beats.  The study included A-fib/flutter burden of 15% with 79% in RVR and 4% and SVR.  The longest episode was 18 hours 59 minutes and the fastest episode was 231 bpm.    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 atrial fibrillation/ Aflutter 4/2023 15 % burden, ANIKA (CPAP), occasional marijuana smoker .     Reports that heart rate has felt better in regards to heart rate since increasing metoprolol. He has not reached out to ANIKA- Provider. He was encouraged to do so ASAP. He was advised the controlled ANIKA will help further decrease burden of Afib. HLD uncontrolled we discussed diet modification. He reports that he has been eating a fairly healthy diet and has been exercising. However, HLD is hereditary.     Will start moderate intensity statin. Patient to continue metoprolol succinate 50 mg daily and continue taking elqiuis 5 mg one tab twice daily.     Follow up in 3 months    Tamika Jackson, MARLENE-CNP

## 2024-06-04 ENCOUNTER — APPOINTMENT (OUTPATIENT)
Dept: CARDIOLOGY | Facility: CLINIC | Age: 59
End: 2024-06-04
Payer: COMMERCIAL

## 2024-06-20 ENCOUNTER — APPOINTMENT (OUTPATIENT)
Dept: CARDIOLOGY | Facility: CLINIC | Age: 59
End: 2024-06-20
Payer: COMMERCIAL

## 2024-07-08 ENCOUNTER — APPOINTMENT (OUTPATIENT)
Dept: SLEEP MEDICINE | Facility: CLINIC | Age: 59
End: 2024-07-08
Payer: COMMERCIAL

## 2024-07-09 ENCOUNTER — APPOINTMENT (OUTPATIENT)
Dept: SLEEP MEDICINE | Facility: CLINIC | Age: 59
End: 2024-07-09
Payer: COMMERCIAL

## 2024-07-09 ENCOUNTER — APPOINTMENT (OUTPATIENT)
Dept: CARDIOLOGY | Facility: CLINIC | Age: 59
End: 2024-07-09
Payer: COMMERCIAL

## 2025-04-23 ENCOUNTER — OFFICE VISIT (OUTPATIENT)
Dept: CARDIOLOGY | Facility: CLINIC | Age: 60
End: 2025-04-23
Payer: COMMERCIAL

## 2025-04-23 VITALS
HEART RATE: 71 BPM | HEIGHT: 69 IN | OXYGEN SATURATION: 98 % | WEIGHT: 193 LBS | SYSTOLIC BLOOD PRESSURE: 136 MMHG | BODY MASS INDEX: 28.58 KG/M2 | DIASTOLIC BLOOD PRESSURE: 72 MMHG

## 2025-04-23 DIAGNOSIS — R09.81 NASAL CONGESTION: ICD-10-CM

## 2025-04-23 DIAGNOSIS — G47.33 OSA (OBSTRUCTIVE SLEEP APNEA): ICD-10-CM

## 2025-04-23 DIAGNOSIS — E78.5 HYPERLIPIDEMIA, UNSPECIFIED HYPERLIPIDEMIA TYPE: ICD-10-CM

## 2025-04-23 DIAGNOSIS — I48.91 CENTRAL SLEEP APNEA ASSOCIATED WITH ATRIAL FIBRILLATION: Primary | ICD-10-CM

## 2025-04-23 DIAGNOSIS — G47.37 CENTRAL SLEEP APNEA ASSOCIATED WITH ATRIAL FIBRILLATION: Primary | ICD-10-CM

## 2025-04-23 DIAGNOSIS — I48.0 PAROXYSMAL ATRIAL FIBRILLATION (MULTI): ICD-10-CM

## 2025-04-23 PROCEDURE — 3008F BODY MASS INDEX DOCD: CPT

## 2025-04-23 PROCEDURE — 99215 OFFICE O/P EST HI 40 MIN: CPT

## 2025-04-23 PROCEDURE — 1036F TOBACCO NON-USER: CPT

## 2025-04-23 RX ORDER — EZETIMIBE 10 MG/1
10 TABLET ORAL DAILY
Qty: 30 TABLET | Refills: 11 | Status: CANCELLED | OUTPATIENT
Start: 2025-04-23 | End: 2026-04-23

## 2025-04-23 RX ORDER — ATORVASTATIN CALCIUM 40 MG/1
40 TABLET, FILM COATED ORAL DAILY
Qty: 30 TABLET | Refills: 11 | Status: CANCELLED | OUTPATIENT
Start: 2025-04-23 | End: 2026-04-23

## 2025-04-23 RX ORDER — FLUTICASONE PROPIONATE 50 MCG
1 SPRAY, SUSPENSION (ML) NASAL DAILY
Qty: 16 G | Refills: 12 | Status: SHIPPED | OUTPATIENT
Start: 2025-04-23 | End: 2026-04-23

## 2025-04-23 RX ORDER — ATORVASTATIN CALCIUM 20 MG/1
20 TABLET, FILM COATED ORAL DAILY
Qty: 30 TABLET | Refills: 11 | Status: SHIPPED | OUTPATIENT
Start: 2025-04-23 | End: 2026-04-23

## 2025-04-23 ASSESSMENT — ENCOUNTER SYMPTOMS
HEADACHES: 1
DIZZINESS: 1

## 2025-04-23 NOTE — PROGRESS NOTES
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 atrial fibrillation/ Aflutter 4/2023 15 % burden, ANIKA , occasional marijuana smoker .      He is here unaccompanied. Reports to having Afib episodes a month ago. He states that he was not using CPAP. After episode he started using CPAP and the episodes subsided. He reports that he does not use CPAP regularly and has not followed up with Sleep Medicine. He reports that he was feeling fairly well up until a couple of days ago when he started having vertigo. He reports he had vertigo several years back. He reports that he has had sinus pressure over the past couple of weeks as well as well as headaches. Patient denies chest pain and angina.  Pt denies shortness of breath, dyspnea on exertion, orthopnea, and paroxysmal nocturnal dyspnea.  Pt denies worsening lower extremity edema.  Pt denies  syncope.  No recent falls.  No fever or chills.  No cough.  No change in bowel or bladder habits.  No sick contacts.  No recent travel.    Review of Systems   Neurological:  Positive for dizziness and headaches.   All other systems reviewed and are negative.    Last Recorded Vitals:  Vitals:    04/23/25 1443   BP: 136/72   Pulse: 71   SpO2: 98%         Past Medical History:  He has no past medical history on file.    Past Surgical History:  He has no past surgical history on file.      Social History:  He reports that he has quit smoking. His smoking use included cigarettes. He has never used smokeless tobacco. He reports that he does not currently use alcohol. He reports current drug use. Drug: Marijuana.    Family History:  Family History[1]     Allergies:  Amoxicillin-pot clavulanate    Outpatient Medications:  Current Outpatient Medications   Medication Instructions    acetaminophen (TYLENOL 8 HOUR ORAL) oral, As needed    atorvastatin (LIPITOR) 20 mg, oral, Daily    dilTIAZem (CARDIZEM) 120  mg, oral, Daily, as directed    Eliquis 5 mg, oral, 2 times daily    FLUoxetine (PROZAC) 20 mg, oral, Daily    meloxicam (MOBIC) 15 mg, oral, Daily    metoprolol succinate XL (TOPROL-XL) 50 mg, oral, Daily       Physical Exam  Constitutional:       Appearance: Normal appearance.   Neck:      Vascular: No carotid bruit.   Cardiovascular:      Rate and Rhythm: Normal rate and regular rhythm.      Pulses: Normal pulses.      Heart sounds: Normal heart sounds.      No gallop.   Pulmonary:      Effort: Pulmonary effort is normal.      Breath sounds: Normal breath sounds.   Abdominal:      Palpations: Abdomen is soft.   Musculoskeletal:      Cervical back: Neck supple.   Skin:     General: Skin is warm.      Capillary Refill: Capillary refill takes less than 2 seconds.   Neurological:      General: No focal deficit present.      Mental Status: He is alert and oriented to person, place, and time.   Psychiatric:         Mood and Affect: Mood normal.            Last Labs:  CBC -  Lab Results   Component Value Date    WBC 5.0 08/31/2023    HGB 15.3 08/31/2023    HCT 46.8 08/31/2023    MCV 91 08/31/2023     08/31/2023       CMP -  Lab Results   Component Value Date    CALCIUM 9.6 08/31/2023    PROT 7.2 02/22/2023    ALBUMIN 4.4 02/22/2023    AST 25 02/22/2023    ALT 25 02/22/2023    ALKPHOS 82 02/22/2023    BILITOT 0.4 02/22/2023       LIPID PANEL -   Lab Results   Component Value Date    CHOL 200 05/14/2024    TRIG 82 05/14/2024    HDL 47.0 05/14/2024    CHHDL 4.3 05/14/2024       RENAL FUNCTION PANEL -   Lab Results   Component Value Date    GLUCOSE 83 08/31/2023     08/31/2023    K 4.1 08/31/2023     08/31/2023    CO2 29 08/31/2023    ANIONGAP 11 08/31/2023    BUN 17 08/31/2023    CREATININE 0.93 08/31/2023    GFRMALE >90 08/31/2023    CALCIUM 9.6 08/31/2023    ALBUMIN 4.4 02/22/2023        Lab Results   Component Value Date    BNP 28 02/22/2023     ECG:  Normal sinus rhythm  Normal ECG  No previous ECGs  available  Confirmed by Neel Ramos ([id number]) on 8/11/2023 11:02:32 AM  Echo:8/16/2023  CONCLUSIONS:  1. Left ventricular systolic function is normal with a 55-60% estimated ejection fraction.     2023 Holter Monitor results: The predominant rhythm was sinus with study included one 3.3-second pause.  He had 9 occurrences of atrial tachycardia with the fastest episode being 259 bpm and the longest episode 8 beats.  There were 173 occurrences of supraventricular tachycardia with the fastest episode 265 bpm and the longest episode 11 beats.  The study included A-fib/flutter burden of 15% with 79% in RVR and 4% and SVR.  The longest episode was 18 hours 59 minutes and the fastest episode was 231 bpm.  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 atrial fibrillation/ Aflutter 4/2023 15 % burden, ANIKA , occasional marijuana smoker .      He is here unaccompanied. Reports to having Afib episodes a month ago. He states that he was not using CPAP. After episode he started using CPAP and the episodes subsided. He reports that he does not use CPAP regularly and has not followed up with Sleep Medicine. He reports that he was feeling fairly well up until a couple of days ago when he started having vertigo. He reports he had vertigo several years back. He reports that he has had sinus pressure over the past couple of weeks as well as well as headaches.     Today he is normotensive. Although [id number] Score is 0 given the combination of Central and Obstructive Sleep Apnea along with non-compliance of CPAP patient should continue Eliquis 5 mg one tab twice daily. He was encouraged to follow up with Sleep Medicine. Given Nasal Congestion will have him start taking Fluticasone Nasal Spray daily.     Most recent LDL uncontrolled at 137 5/2024. However, he reports having had Lipid Panel with PCP and stated it was normal . Nevertheless, do not have lab results.  Patient asked to call  with results or have PCP fax results.     -Will have him obtain a Cardiac CT Score    - Follow up with PCP regarding Vertigo if it still continues after several days.     - Follow up in 5 months with General Cardiology.     MARLENE Bustos-CNP         [1] No family history on file.

## 2025-04-23 NOTE — PATIENT INSTRUCTIONS
Miguel,    Cardiac CT Score [phone]    Sleep Medicine Provider [phone]     Continue Using CPAP    Fluticasone Nasal . Spray each nostril daily     Please have PCP fax Lipid Panel Results.    Follow up in 4 months    Tamika ROSARIO NP-C

## 2025-04-27 DIAGNOSIS — I48.0 PAROXYSMAL ATRIAL FIBRILLATION (MULTI): ICD-10-CM

## 2025-04-29 RX ORDER — METOPROLOL SUCCINATE 50 MG/1
50 TABLET, EXTENDED RELEASE ORAL DAILY
Qty: 90 TABLET | Refills: 3 | Status: SHIPPED | OUTPATIENT
Start: 2025-04-29

## 2025-05-21 NOTE — PROGRESS NOTES
Department of Gastroenterology & Hepatology  Initial Consult Note  Date of Service:  May 22, 2025         Patient: Miguel Cordova    Medical Record: [id number]  Reason for Admission / Consultation: colon cancer screening  Gastroenterology Attending: Pb Barnes MD  Referring Provider: MD Jerrell Moreno MD  [address], [place] [address]         My final recommendations will be communicated back to the requesting physician by way of shared medical record or letter via US mail         Impression:   59-year-old pleasant male with past medical history of hypertension, hyperlipidemia here for colon cancer screening    Colon cancer screening  Of colon cancer in father at 65  Never had a colonoscopy or Cologuard  Will schedule colonoscopy      Alcohol use  Was drinking 12 pack of beer per week and but now drinking 6 pack every weekend  Counseled about alcohol cessation  Will check CBC and liver function test, any abnormality will get right upper quadrant ultrasound      Pb Barnes MD  Gastroenterology, Hepatology and Nutrition  Advanced Endoscopy  =========================================================================  History of Present Illness:     Miguel Cordova  is a 59 y.o.   has no past medical history on file. who presents for colon cancer screening.  Never had a colonoscopy or Cologuard test done before.  His father was diagnosed with colon cancer age around 65.  Brother had polyps.  Denies any nausea, vomiting, abdominal pain or blood in stool.  Having 1 formed bowel movement every day.  No weight loss    Drinks 6 pack of beer every weekend, used to drink significantly more  Smokes marijuana every week and is not working  Denies over-the-counter NSAIDs       Pertinent Review of Systems:    Per HPI rest 12 point ROS negative      Past Medical History:    Medical History[1]     Past Surgical History:  Surgical History[2]     Family  "History:  Family History[3]     Social History:  Social History     Tobacco Use    Smoking status: Former     Types: Cigarettes    Smokeless tobacco: Never   Substance Use Topics    Alcohol use: Yes     Comment: socially        Allergies:  RX Allergies[4]      Medications:  Medications Ordered Prior to Encounter[5]         Physical Exam:  BP (!) 129/93 (BP Location: Right arm, Patient Position: Sitting)   Pulse 51   Ht 1.753 m (5' 9\")   Wt 91.3 kg (201 lb 3.2 oz)   BMI 29.71 kg/m²    General appearance: well appearing, alert, in no acute distress  Skin:  no rashes or lesions  Head: normal  Eyes: Anicteric sclera.   ENT: No oral erythema  Lungs: lungs clear to auscultation, no wheezing or rhonchi  Heart: RRR without murmur  Abdomen:  Abdomen soft, non-tender. Bowel sounds normal.   Extremities: Extremities normal.   Musculoskeletal: Muscular strength grossly intact  Neuro: CN2-12 grossly intact     Labs:  Current Medications[6]          SIGNATURE: Pb Barnes MD PATIENT NAME: Miguel Cordova   DATE: May 22, 2025 MRN: [id number]   TIME: 10:15 AM           [1] No past medical history on file.  [2] No past surgical history on file.  [3]   Family History  Problem Relation Name Age of Onset    Colon cancer Father      Colon cancer Brother     [4]   Allergies  Allergen Reactions    Amoxicillin-Pot Clavulanate GI Upset   [5]   Current Outpatient Medications on File Prior to Visit   Medication Sig Dispense Refill    atorvastatin (Lipitor) 20 mg tablet Take 1 tablet (20 mg) by mouth once daily. 30 tablet 11    clobetasol (Temovate) 0.05 % cream       clotrimazole (Lotrimin) 1 % cream       Eliquis 5 mg tablet Take 1 tablet (5 mg) by mouth 2 times a day. 90 tablet 3    metoprolol succinate XL (Toprol-XL) 50 mg 24 hr tablet TAKE 1 TABLET BY MOUTH EVERY DAY 90 tablet 3    predniSONE (Deltasone) 20 mg tablet       triamcinolone (Kenalog) 0.1 % cream       acetaminophen (TYLENOL 8 HOUR ORAL) Take by mouth if needed. " (Patient not taking: Reported on 5/22/2025)      dilTIAZem (Cardizem) 120 mg immediate release tablet Take 1 tablet (120 mg) by mouth once daily. as directed (Patient not taking: Reported on 5/22/2025)      FLUoxetine (PROzac) 20 mg capsule Take 1 capsule (20 mg) by mouth once daily. (Patient not taking: Reported on 5/22/2025)      fluticasone (Flonase) 50 mcg/actuation nasal spray Administer 1 spray into each nostril once daily. Shake gently. Before first use, prime pump. After use, clean tip and replace cap. (Patient not taking: Reported on 5/22/2025) 16 g 12    meloxicam (Mobic) 15 mg tablet Take 1 tablet (15 mg) by mouth once daily. (Patient not taking: Reported on 5/22/2025)      [DISCONTINUED] escitalopram (Lexapro) 5 mg tablet Take 1 tablet (5 mg) by mouth once daily.       No current facility-administered medications on file prior to visit.   [6]   Current Outpatient Medications:     atorvastatin (Lipitor) 20 mg tablet, Take 1 tablet (20 mg) by mouth once daily., Disp: 30 tablet, Rfl: 11    clobetasol (Temovate) 0.05 % cream, , Disp: , Rfl:     clotrimazole (Lotrimin) 1 % cream, , Disp: , Rfl:     Eliquis 5 mg tablet, Take 1 tablet (5 mg) by mouth 2 times a day., Disp: 90 tablet, Rfl: 3    metoprolol succinate XL (Toprol-XL) 50 mg 24 hr tablet, TAKE 1 TABLET BY MOUTH EVERY DAY, Disp: 90 tablet, Rfl: 3    predniSONE (Deltasone) 20 mg tablet, , Disp: , Rfl:     triamcinolone (Kenalog) 0.1 % cream, , Disp: , Rfl:     acetaminophen (TYLENOL 8 HOUR ORAL), Take by mouth if needed. (Patient not taking: Reported on 5/22/2025), Disp: , Rfl:     dilTIAZem (Cardizem) 120 mg immediate release tablet, Take 1 tablet (120 mg) by mouth once daily. as directed (Patient not taking: Reported on 5/22/2025), Disp: , Rfl:     FLUoxetine (PROzac) 20 mg capsule, Take 1 capsule (20 mg) by mouth once daily. (Patient not taking: Reported on 5/22/2025), Disp: , Rfl:     fluticasone (Flonase) 50 mcg/actuation nasal spray, Administer 1  spray into each nostril once daily. Shake gently. Before first use, prime pump. After use, clean tip and replace cap. (Patient not taking: Reported on 5/22/2025), Disp: 16 g, Rfl: 12    meloxicam (Mobic) 15 mg tablet, Take 1 tablet (15 mg) by mouth once daily. (Patient not taking: Reported on 5/22/2025), Disp: , Rfl:     sodium,potassium,mag sulfates (Suprep) 17.5-3.13-1.6 gram solution, Take one bottle twice as directed by the prep instructions, Disp: 354 mL, Rfl: 0

## 2025-05-22 ENCOUNTER — TELEPHONE (OUTPATIENT)
Dept: GASTROENTEROLOGY | Facility: CLINIC | Age: 60
End: 2025-05-22

## 2025-05-22 ENCOUNTER — APPOINTMENT (OUTPATIENT)
Dept: GASTROENTEROLOGY | Facility: CLINIC | Age: 60
End: 2025-05-22
Payer: COMMERCIAL

## 2025-05-22 VITALS
BODY MASS INDEX: 29.8 KG/M2 | HEART RATE: 51 BPM | WEIGHT: 201.2 LBS | HEIGHT: 69 IN | SYSTOLIC BLOOD PRESSURE: 129 MMHG | DIASTOLIC BLOOD PRESSURE: 93 MMHG

## 2025-05-22 DIAGNOSIS — F10.90 ALCOHOL USE: Primary | ICD-10-CM

## 2025-05-22 DIAGNOSIS — Z12.11 COLON CANCER SCREENING: Primary | ICD-10-CM

## 2025-05-22 DIAGNOSIS — Z12.11 ENCOUNTER FOR SCREENING FOR MALIGNANT NEOPLASM OF COLON: ICD-10-CM

## 2025-05-22 PROCEDURE — 3008F BODY MASS INDEX DOCD: CPT | Performed by: INTERNAL MEDICINE

## 2025-05-22 PROCEDURE — 99204 OFFICE O/P NEW MOD 45 MIN: CPT | Performed by: INTERNAL MEDICINE

## 2025-05-22 PROCEDURE — 1036F TOBACCO NON-USER: CPT | Performed by: INTERNAL MEDICINE

## 2025-05-22 RX ORDER — PREDNISONE 20 MG/1
TABLET ORAL
COMMUNITY
Start: 2025-05-05

## 2025-05-22 RX ORDER — CLOBETASOL PROPIONATE 0.5 MG/G
CREAM TOPICAL
COMMUNITY
Start: 2025-04-30

## 2025-05-22 RX ORDER — TRIAMCINOLONE ACETONIDE 1 MG/G
CREAM TOPICAL
COMMUNITY
Start: 2025-03-28

## 2025-05-22 RX ORDER — SODIUM, POTASSIUM,MAG SULFATES 17.5-3.13G
SOLUTION, RECONSTITUTED, ORAL ORAL
Qty: 354 ML | Refills: 0 | Status: SHIPPED | OUTPATIENT
Start: 2025-05-22

## 2025-05-22 RX ORDER — CLOTRIMAZOLE 1 %
CREAM (GRAM) TOPICAL
COMMUNITY
Start: 2025-02-24

## 2025-05-22 NOTE — TELEPHONE ENCOUNTER
Pt lvm stating he had questions regarding blood work.    Returned pt call. All questions answered.

## 2025-06-11 LAB
ALBUMIN SERPL-MCNC: 4.3 G/DL (ref 3.6–5.1)
ALBUMIN/GLOB SERPL: 2 (CALC) (ref 1–2.5)
ALP SERPL-CCNC: 74 U/L (ref 35–144)
ALT SERPL-CCNC: 38 U/L (ref 9–46)
AST SERPL-CCNC: 23 U/L (ref 10–35)
BILIRUB DIRECT SERPL-MCNC: 0.2 MG/DL
BILIRUB INDIRECT SERPL-MCNC: 0.5 MG/DL (CALC) (ref 0.2–1.2)
BILIRUB SERPL-MCNC: 0.7 MG/DL (ref 0.2–1.2)
ERYTHROCYTE [DISTWIDTH] IN BLOOD BY AUTOMATED COUNT: 13.9 % (ref 11–15)
GLOBULIN SER CALC-MCNC: 2.2 G/DL (CALC) (ref 1.9–3.7)
HCT VFR BLD AUTO: 47.2 % (ref 38.5–50)
HGB BLD-MCNC: 15 G/DL (ref 13.2–17.1)
MCH RBC QN AUTO: 29.8 PG (ref 27–33)
MCHC RBC AUTO-ENTMCNC: 31.8 G/DL (ref 32–36)
MCV RBC AUTO: 93.8 FL (ref 80–100)
PLATELET # BLD AUTO: 189 THOUSAND/UL (ref 140–400)
PMV BLD REES-ECKER: 10.4 FL (ref 7.5–12.5)
PROT SERPL-MCNC: 6.5 G/DL (ref 6.1–8.1)
RBC # BLD AUTO: 5.03 MILLION/UL (ref 4.2–5.8)
WBC # BLD AUTO: 4.5 THOUSAND/UL (ref 3.8–10.8)

## 2025-06-18 ENCOUNTER — APPOINTMENT (OUTPATIENT)
Dept: RADIOLOGY | Facility: HOSPITAL | Age: 60
End: 2025-06-18
Payer: COMMERCIAL

## 2025-06-19 ENCOUNTER — HOSPITAL ENCOUNTER (OUTPATIENT)
Dept: RADIOLOGY | Facility: HOSPITAL | Age: 60
Discharge: HOME | End: 2025-06-19
Payer: COMMERCIAL

## 2025-06-19 DIAGNOSIS — E78.5 HYPERLIPIDEMIA, UNSPECIFIED HYPERLIPIDEMIA TYPE: ICD-10-CM

## 2025-06-19 PROCEDURE — 75571 CT HRT W/O DYE W/CA TEST: CPT

## 2025-06-29 DIAGNOSIS — I48.0 PAROXYSMAL ATRIAL FIBRILLATION (MULTI): ICD-10-CM

## 2025-06-30 RX ORDER — APIXABAN 5 MG/1
5 TABLET, FILM COATED ORAL 2 TIMES DAILY
Qty: 90 TABLET | Refills: 3 | Status: SHIPPED | OUTPATIENT
Start: 2025-06-30

## 2025-07-08 ENCOUNTER — OFFICE VISIT (OUTPATIENT)
Dept: CARDIOLOGY | Facility: CLINIC | Age: 60
End: 2025-07-08
Payer: COMMERCIAL

## 2025-07-08 ENCOUNTER — ANCILLARY PROCEDURE (OUTPATIENT)
Facility: CLINIC | Age: 60
End: 2025-07-08
Payer: COMMERCIAL

## 2025-07-08 VITALS
OXYGEN SATURATION: 98 % | HEIGHT: 69 IN | BODY MASS INDEX: 27.99 KG/M2 | SYSTOLIC BLOOD PRESSURE: 144 MMHG | DIASTOLIC BLOOD PRESSURE: 94 MMHG | WEIGHT: 189 LBS | HEART RATE: 69 BPM | RESPIRATION RATE: 20 BRPM

## 2025-07-08 DIAGNOSIS — R00.2 PALPITATIONS: ICD-10-CM

## 2025-07-08 DIAGNOSIS — G47.37 CENTRAL SLEEP APNEA ASSOCIATED WITH ATRIAL FIBRILLATION: ICD-10-CM

## 2025-07-08 DIAGNOSIS — I48.0 PAROXYSMAL ATRIAL FIBRILLATION (MULTI): ICD-10-CM

## 2025-07-08 DIAGNOSIS — E78.5 HYPERLIPIDEMIA, UNSPECIFIED HYPERLIPIDEMIA TYPE: ICD-10-CM

## 2025-07-08 DIAGNOSIS — G47.33 OSA (OBSTRUCTIVE SLEEP APNEA): ICD-10-CM

## 2025-07-08 DIAGNOSIS — R06.09 DOE (DYSPNEA ON EXERTION): ICD-10-CM

## 2025-07-08 DIAGNOSIS — I48.91 ATRIAL FIBRILLATION, UNSPECIFIED TYPE (MULTI): ICD-10-CM

## 2025-07-08 DIAGNOSIS — I48.91 CENTRAL SLEEP APNEA ASSOCIATED WITH ATRIAL FIBRILLATION: ICD-10-CM

## 2025-07-08 DIAGNOSIS — R06.02 SHORTNESS OF BREATH: ICD-10-CM

## 2025-07-08 DIAGNOSIS — R06.02 SHORTNESS OF BREATH: Primary | ICD-10-CM

## 2025-07-08 DIAGNOSIS — I47.10 SVT (SUPRAVENTRICULAR TACHYCARDIA): ICD-10-CM

## 2025-07-08 LAB — BODY SURFACE AREA: 2.04 M2

## 2025-07-08 PROCEDURE — 99215 OFFICE O/P EST HI 40 MIN: CPT | Performed by: STUDENT IN AN ORGANIZED HEALTH CARE EDUCATION/TRAINING PROGRAM

## 2025-07-08 PROCEDURE — 3008F BODY MASS INDEX DOCD: CPT | Performed by: STUDENT IN AN ORGANIZED HEALTH CARE EDUCATION/TRAINING PROGRAM

## 2025-07-08 PROCEDURE — 93242 EXT ECG>48HR<7D RECORDING: CPT

## 2025-07-08 PROCEDURE — 1036F TOBACCO NON-USER: CPT | Performed by: STUDENT IN AN ORGANIZED HEALTH CARE EDUCATION/TRAINING PROGRAM

## 2025-07-08 PROCEDURE — 99212 OFFICE O/P EST SF 10 MIN: CPT | Performed by: STUDENT IN AN ORGANIZED HEALTH CARE EDUCATION/TRAINING PROGRAM

## 2025-07-08 RX ORDER — METOPROLOL SUCCINATE 100 MG/1
100 TABLET, EXTENDED RELEASE ORAL DAILY
Qty: 90 TABLET | Refills: 3 | Status: SHIPPED | OUTPATIENT
Start: 2025-07-08 | End: 2026-07-08

## 2025-07-08 ASSESSMENT — PAIN SCALES - GENERAL: PAINLEVEL_OUTOF10: 10-WORST PAIN EVER

## 2025-07-08 NOTE — PROGRESS NOTES
Referred by Dr. Cordova for Shortness of Breath     HPI:    Miguel Cordova is a 59 y.o. adult with pertinent history of attention deficit hyperactivity disorder, history of marijuana use, obstructive sleep apnea, history of paroxysmal atrial fibrillation/atrial flutter with 15% burden in April 2023, preserved LVEF of 55 to 60% on echo performed 8/16/2023 presents to cardiology clinic to establish care.     He is concerned about increasing dyspnea on exertion.  It limits when he goes upstairs.  He also notes some increased palpitations.  He has had trouble maintaining compliance with his CPAP due to discomfort of the mask.  We did review discuss his prior echocardiogram and event monitor.  He continues to smoke marijuana.  No exacerbating or relieving factors.  Patient denies chest pain and angina.  Pt denies orthopnea, and paroxysmal nocturnal dyspnea.  Pt denies worsening lower extremity edema.  Pt denies syncope.  No recent falls.  No fever or chills.  No cough.  No change in bowel or bladder habits.  No sick contacts.  No recent travel.    12 point review of systems including (Constitutional, Eyes, ENMT, Respiratory, Cardiac, Gastrointestinal, Neurological, Psychiatric, and Hematologic) was performed and is otherwise negative.    Past medical history reviewed  Past surgical history reviewed    Social history reviewed:   reports that he has quit smoking. His smoking use included cigarettes. He has never used smokeless tobacco. He reports current alcohol use. He reports current drug use. Drug: Marijuana.     Family history reviewed:  Family History[1]    Allergies reviewed: Amoxicillin-pot clavulanate     Medications reviewed:   Current Outpatient Medications   Medication Instructions    acetaminophen (TYLENOL 8 HOUR ORAL) As needed    atorvastatin (LIPITOR) 20 mg, oral, Daily    clobetasol (Temovate) 0.05 % cream     clotrimazole (Lotrimin) 1 % cream     dilTIAZem (CARDIZEM) 120 mg, Daily    Eliquis 5 mg,  oral, 2 times daily    FLUoxetine (PROZAC) 20 mg, Daily    fluticasone (Flonase) 50 mcg/actuation nasal spray 1 spray, Each Nostril, Daily, Shake gently. Before first use, prime pump. After use, clean tip and replace cap.    meloxicam (MOBIC) 15 mg, Daily    metoprolol succinate XL (TOPROL-XL) 50 mg, oral, Daily    predniSONE (Deltasone) 20 mg tablet     sodium,potassium,mag sulfates (Suprep) 17.5-3.13-1.6 gram solution Take one bottle twice as directed by the prep instructions    triamcinolone (Kenalog) 0.1 % cream         Vitals reviewed: Visit Vitals  BP (!) 144/94 (BP Location: Left arm, Patient Position: Sitting, BP Cuff Size: Adult)   Pulse 69   Resp 20       Physical Exam:   General:  Patient is awake, alert, and oriented.  Patient is in no acute distress.  HEENT:  Pupils equal and reactive.  Normocephalic.  Moist mucosa.    Neck:  No thyromegaly.  Normal Jugular Venous Pressure.  Cardiovascular:  Regular rate and rhythm.  Normal S1 and S2.  1/6 LELIA.  Pulmonary:  Clear to auscultation bilaterally.  Abdomen:  Soft. Non-tender.   Non-distended.  Positive bowel sounds.  Lower Extremities:  2+ pedal pulses. No LE edema.  Neurologic:  Cranial nerves intact.  No focal deficit.   Skin: Skin warm and dry, normal skin turgor.   Psychiatric: Normal affect.    Last Labs:  CBC -      Lab Results   Component Value Date    WBC 4.5 06/11/2025    HGB 15.0 06/11/2025    HCT 47.2 06/11/2025     06/11/2025        CMP-  Lab Results   Component Value Date    GLUCOSE 83 08/31/2023     08/31/2023    K 4.1 08/31/2023     08/31/2023    CO2 29 08/31/2023    ANIONGAP 11 08/31/2023    BUN 17 08/31/2023    CREATININE 0.93 08/31/2023    CALCIUM 9.6 08/31/2023    PROT 6.5 06/11/2025    ALBUMIN 4.3 06/11/2025    AST 23 06/11/2025    ALT 38 06/11/2025    ALKPHOS 74 06/11/2025    BILITOT 0.7 06/11/2025        LIPIDS-  Lab Results   Component Value Date    CHOL 200 05/14/2024    TRIG 82 05/14/2024    HDL 47.0 05/14/2024     CHHDL 4.3 05/14/2024        OTHERS-  Lab Results   Component Value Date    BNP 28 02/22/2023        I personally reviewed the patient's recent vitals, labs, medications, orders, EKGs, pertinent cardiac imaging/ echocardiography and event monitor.    Assessment and Plan:    Miguel Cordova is a 59 y.o. adult with pertinent history of attention deficit hyperactivity disorder, history of marijuana use, obstructive sleep apnea, history of paroxysmal atrial fibrillation/atrial flutter with 15% burden in April 2023, preserved LVEF of 55 to 60% on echo performed 8/16/2023 presents to cardiology clinic to establish care.   He is concerned about increasing dyspnea on exertion.  It limits when he goes upstairs.  He also notes some increased palpitations.  He has had trouble maintaining compliance with his CPAP due to discomfort of the mask.  We did review discuss his prior echocardiogram and event monitor.  He continues to smoke marijuana.  Clinically he appears euvolemic.    Given the patient's symptoms and in order to further evaluate possible arrhythmias, we will plan to perform an event monitor.    We will obtain a transthoracic echocardiogram for structural evaluation including ejection fraction, assessment of regional wall motion abnormalities or valvular disease, and further evaluation of hemodynamics.    Given the patient's risk factors and symptoms, we will obtain a nuclear treadmill stress test for further ischemic evaluation. Please avoid caffeine the day prior to and the day of the stress test. Do not eat the morning of the stress test or 12 hours prior.  Please do not take Metoprolol the morning of your stress test.  You can continue all other medications.  We also plan to obtain pulmonary function testing to check on your lungs.    We will increase metoprolol to succinate from 50 mg once daily up to 100 mg once daily.    Please continue remaining cardiac medications including Eliquis 5 mg twice daily and  atorvastatin 20 mg once daily.    Please followup with me in Cardiology clinic within the next 4 weeks.  Please return to clinic sooner or seek emergent care if your symptoms reoccur or worsen.    Thank you for allowing me to participate in their care.  Please feel free to call me with any further questions or concerns.        Neel Ramos MD, [facility], [facility]  Division of Cardiovascular Medicine  System Director, Nuclear Cardiology   Medical Director, [facility]   Clinical , [facility] [employer]  [email or web address]   Office:  [phone]            [1]   Family History  Problem Relation Name Age of Onset    Colon cancer Father      Colon cancer Brother

## 2025-07-08 NOTE — PATIENT INSTRUCTIONS
We will plan to perform an event monitor.    We will obtain a transthoracic echocardiogram for structural evaluation including ejection fraction, assessment of regional wall motion abnormalities or valvular disease, and further evaluation of hemodynamics.    We will obtain a nuclear treadmill stress test for further ischemic evaluation. Please avoid caffeine the day prior to and the day of the stress test. Do not eat the morning of the stress test or 12 hours prior.  Please do not take Metoprolol the morning of your stress test.  You can continue all other medications.  We also plan to obtain pulmonary function testing to check on your lungs.    We will increase metoprolol to succinate from 50 mg once daily up to 100 mg once daily.    Please continue remaining cardiac medications including Eliquis 5 mg twice daily and atorvastatin 20 mg once daily.    Please followup with me in Cardiology clinic within the next 4 weeks.  Please return to clinic sooner or seek emergent care if your symptoms reoccur or worsen.

## 2025-07-15 ENCOUNTER — TELEPHONE (OUTPATIENT)
Dept: CARDIOLOGY | Facility: CLINIC | Age: 60
End: 2025-07-15
Payer: COMMERCIAL

## 2025-07-15 NOTE — TELEPHONE ENCOUNTER
Pt called and wanted to know when to stop eliquis for his upcoming colonoscopy. I reached out to Tamika SAMANO via secure chat and she said the patient can stop 3 days before the procedure.  I relayed Tamika's recommendations to the patient and he verbalized understanding and will call back with further questions or concerns.

## 2025-07-22 ENCOUNTER — ANESTHESIA EVENT (OUTPATIENT)
Dept: GASTROENTEROLOGY | Facility: HOSPITAL | Age: 60
End: 2025-07-22
Payer: COMMERCIAL

## 2025-07-22 ENCOUNTER — HOSPITAL ENCOUNTER (OUTPATIENT)
Dept: GASTROENTEROLOGY | Facility: HOSPITAL | Age: 60
Discharge: HOME | End: 2025-07-22
Payer: COMMERCIAL

## 2025-07-22 ENCOUNTER — ANESTHESIA (OUTPATIENT)
Dept: GASTROENTEROLOGY | Facility: HOSPITAL | Age: 60
End: 2025-07-22
Payer: COMMERCIAL

## 2025-07-22 VITALS
OXYGEN SATURATION: 97 % | RESPIRATION RATE: 16 BRPM | SYSTOLIC BLOOD PRESSURE: 111 MMHG | TEMPERATURE: 97.3 F | BODY MASS INDEX: 27.98 KG/M2 | DIASTOLIC BLOOD PRESSURE: 65 MMHG | HEART RATE: 70 BPM | WEIGHT: 188.93 LBS | HEIGHT: 69 IN

## 2025-07-22 DIAGNOSIS — Z12.11 ENCOUNTER FOR SCREENING FOR MALIGNANT NEOPLASM OF COLON: ICD-10-CM

## 2025-07-22 PROCEDURE — 2720000007 HC OR 272 NO HCPCS

## 2025-07-22 PROCEDURE — A45385 PR COLONOSCOPY,REMV LESN,SNARE: Performed by: ANESTHESIOLOGY

## 2025-07-22 PROCEDURE — 3700000002 HC GENERAL ANESTHESIA TIME - EACH INCREMENTAL 1 MINUTE

## 2025-07-22 PROCEDURE — 7100000009 HC PHASE TWO TIME - INITIAL BASE CHARGE

## 2025-07-22 PROCEDURE — 45385 COLONOSCOPY W/LESION REMOVAL: CPT | Performed by: INTERNAL MEDICINE

## 2025-07-22 PROCEDURE — 2500000004 HC RX 250 GENERAL PHARMACY W/ HCPCS (ALT 636 FOR OP/ED): Performed by: NURSE ANESTHETIST, CERTIFIED REGISTERED

## 2025-07-22 PROCEDURE — 7100000010 HC PHASE TWO TIME - EACH INCREMENTAL 1 MINUTE

## 2025-07-22 PROCEDURE — A45385 PR COLONOSCOPY,REMV LESN,SNARE: Performed by: NURSE ANESTHETIST, CERTIFIED REGISTERED

## 2025-07-22 PROCEDURE — 3700000001 HC GENERAL ANESTHESIA TIME - INITIAL BASE CHARGE

## 2025-07-22 RX ORDER — PHENYLEPHRINE HCL IN 0.9% NACL 1 MG/10 ML
SYRINGE (ML) INTRAVENOUS AS NEEDED
Status: DISCONTINUED | OUTPATIENT
Start: 2025-07-22 | End: 2025-07-22

## 2025-07-22 RX ORDER — PROPOFOL 10 MG/ML
INJECTION, EMULSION INTRAVENOUS AS NEEDED
Status: DISCONTINUED | OUTPATIENT
Start: 2025-07-22 | End: 2025-07-22

## 2025-07-22 RX ADMIN — PROPOFOL 125 MCG/KG/MIN: 10 INJECTION, EMULSION INTRAVENOUS at 09:59

## 2025-07-22 RX ADMIN — PROPOFOL 50 MG: 10 INJECTION, EMULSION INTRAVENOUS at 10:04

## 2025-07-22 RX ADMIN — Medication 200 MCG: at 10:28

## 2025-07-22 RX ADMIN — PROPOFOL 50 MG: 10 INJECTION, EMULSION INTRAVENOUS at 10:00

## 2025-07-22 RX ADMIN — SODIUM CHLORIDE, POTASSIUM CHLORIDE, SODIUM LACTATE AND CALCIUM CHLORIDE: 600; 310; 30; 20 INJECTION, SOLUTION INTRAVENOUS at 09:57

## 2025-07-22 RX ADMIN — PROPOFOL 20 MG: 10 INJECTION, EMULSION INTRAVENOUS at 10:06

## 2025-07-22 SDOH — HEALTH STABILITY: MENTAL HEALTH: CURRENT SMOKER: 0

## 2025-07-22 ASSESSMENT — PAIN SCALES - GENERAL
PAIN_LEVEL: 0
PAINLEVEL_OUTOF10: 10 - WORST POSSIBLE PAIN
PAINLEVEL_OUTOF10: 0 - NO PAIN

## 2025-07-22 ASSESSMENT — PAIN - FUNCTIONAL ASSESSMENT
PAIN_FUNCTIONAL_ASSESSMENT: 0-10
PAIN_FUNCTIONAL_ASSESSMENT: 0-10

## 2025-07-22 ASSESSMENT — PAIN DESCRIPTION - DESCRIPTORS: DESCRIPTORS: SHOOTING

## 2025-07-22 NOTE — H&P
Outpatient Hospital Procedure    Patient Profile-Procedures  Initial Info  Patient Demographics  Name Miguel Cordova  Date of Birth 1965  MRN [id number]  Address   [address] [id number] [facility] [id number]    Primary Phone Number [phone]  Secondary Phone Number    PCP Jerrell Leong    Procedures   Colonoscopy      Indication:  CRC screen  FH of colon cancer in father at 65    Primary contact name and number   Extended Emergency Contact Information  Primary Emergency Contact: AMEE HERNDON   [facility]  Home Phone: [phone]  Work Phone: [phone]  Mobile Phone: [phone]  Relation: Parent   needed? No    General Health  Weight   Vitals:    07/22/25 0903   Weight: 85.7 kg (188 lb 15 oz)     BMI Body mass index is 27.9 kg/m².    Allergies  RX Allergies[1]    Past Medical History   Medical History[2]    Provider assessment  Diagnosis  Medication Reviewed - yes  Prior to Admission medications   Medication Sig Start Date End Date Taking? Authorizing Provider   atorvastatin (Lipitor) 20 mg tablet Take 1 tablet (20 mg) by mouth once daily. 4/23/25 4/23/26 Yes MARLENE Bustos-CNP   Eliquis 5 mg tablet TAKE 1 TABLET BY MOUTH TWICE A DAY 6/30/25  Yes Neel ODEN MD   metoprolol succinate XL (Toprol-XL) 100 mg 24 hr tablet Take 1 tablet (100 mg) by mouth once daily. 7/8/25 7/8/26 Yes Neel ODEN MD   escitalopram (Lexapro) 5 mg tablet Take 1 tablet (5 mg) by mouth once daily. 4/6/23 5/6/23  Historical Provider, MD   sodium,potassium,mag sulfates (Suprep) 17.5-3.13-1.6 gram solution Take one bottle twice as directed by the prep instructions  Patient not taking: Reported on 7/22/2025 5/22/25 7/22/25  Pb Barnes MD       This is my H&P    Physical Exam  Physical Exam  Constitutional:       Appearance: Normal appearance.   HENT:      Head: Normocephalic.      Mouth/Throat:      Mouth: Mucous membranes are  moist.     Eyes:      Extraocular Movements: Extraocular movements intact.      Pupils: Pupils are equal, round, and reactive to light.       Cardiovascular:      Rate and Rhythm: Normal rate and regular rhythm.      Pulses: Normal pulses.      Heart sounds: Normal heart sounds.   Pulmonary:      Effort: Pulmonary effort is normal.      Breath sounds: Normal breath sounds.   Abdominal:      Palpations: Abdomen is soft.     Neurological:      General: No focal deficit present.      Mental Status: He is alert.         ASA PS Classification 3  Sedation Plan MAC  Procedure Plan - pre-procedural (re)assesment completed by physician:  discharge/transfer patient when discharge criteria met    Pb Barnes MD  7/22/2025 9:51 AM           [1]   Allergies  Allergen Reactions    Amoxicillin-Pot Clavulanate GI Upset   [2] No past medical history on file.

## 2025-07-22 NOTE — ANESTHESIA POSTPROCEDURE EVALUATION
Patient: Miguel Cordova    Procedure Summary       Date: 07/22/25 Room / Location: [facility]    Anesthesia Start: 0957 Anesthesia Stop: 1037    Procedure: COLONOSCOPY Diagnosis: Encounter for screening for malignant neoplasm of colon    Scheduled Providers: Pb Barnes MD; Michael Ag MD Responsible Provider: Michael Ag MD    Anesthesia Type: MAC ASA Status: 3            Anesthesia Type: MAC    Vitals Value Taken Time   /56 07/22/25 10:36   Temp 36.2 07/22/25 10:37   Pulse 113 07/22/25 10:36   Resp 31 07/22/25 10:36   SpO2 98 % 07/22/25 10:36   Vitals shown include unfiled device data.    Anesthesia Post Evaluation    Patient location during evaluation: PACU  Patient participation: complete - patient participated  Level of consciousness: awake and alert  Pain score: 0  Pain management: adequate  Multimodal analgesia pain management approach  Airway patency: patent  Two or more strategies used to mitigate risk of obstructive sleep apnea  Cardiovascular status: acceptable, hemodynamically stable and hypotensive  Respiratory status: acceptable and nasal cannula  Hydration status: acceptable  Postoperative Nausea and Vomiting: none  Comments: Patient was hypotensive in recovery given 200 mcg neosyneprine given IV Dr. Ag at bedside, BP increased        There were no known notable events for this encounter.

## 2025-07-22 NOTE — ANESTHESIA PREPROCEDURE EVALUATION
Patient: Miguel Cordova    Procedure Information       Date/Time: 07/22/25 1000    Scheduled providers: Pb Barnes MD; Michael Ag MD    Procedure: COLONOSCOPY    Location: [facility]            Relevant Problems   Cardiac   (+) Atrial fibrillation (Multi)   (+) Hyperlipidemia   (+) SVT (supraventricular tachycardia)      Pulmonary   (+) ANIKA (obstructive sleep apnea)      Musculoskeletal   (+) Lumbar disc herniation       Clinical information reviewed:    Allergies  Meds               NPO Detail:  NPO/Void Status  Carbohydrate Drink Given Prior to Surgery? : N  Date of Last Liquid: 07/22/25  Time of Last Liquid: 0700  Date of Last Solid: 07/20/25  Time of Last Solid: 1200  Last Intake Type: Clear fluids         Physical Exam    Airway  Mallampati: II  TM distance: >3 FB  Neck ROM: full  Mouth opening: 3 or more finger widths     Cardiovascular   Rhythm: irregular     Dental - normal exam     Pulmonary    Abdominal            Anesthesia Plan    History of general anesthesia?: no  History of complications of general anesthesia?: no    ASA 3     MAC     The patient is not a current smoker.  Patient was not previously instructed to abstain from smoking on day of procedure.  Patient did not smoke on day of procedure.    intravenous induction   Anesthetic plan and risks discussed with patient.  Use of blood products discussed with patient who.    Plan discussed with CRNA.

## 2025-07-23 ENCOUNTER — HOSPITAL ENCOUNTER (OUTPATIENT)
Dept: RADIOLOGY | Facility: HOSPITAL | Age: 60
Discharge: HOME | End: 2025-07-23
Payer: COMMERCIAL

## 2025-07-23 ENCOUNTER — HOSPITAL ENCOUNTER (OUTPATIENT)
Dept: CARDIOLOGY | Facility: HOSPITAL | Age: 60
Discharge: HOME | End: 2025-07-23
Payer: COMMERCIAL

## 2025-07-23 DIAGNOSIS — E78.5 HYPERLIPIDEMIA, UNSPECIFIED HYPERLIPIDEMIA TYPE: ICD-10-CM

## 2025-07-23 DIAGNOSIS — I48.91 CENTRAL SLEEP APNEA ASSOCIATED WITH ATRIAL FIBRILLATION: ICD-10-CM

## 2025-07-23 DIAGNOSIS — R06.02 SHORTNESS OF BREATH: ICD-10-CM

## 2025-07-23 DIAGNOSIS — R06.09 DOE (DYSPNEA ON EXERTION): ICD-10-CM

## 2025-07-23 DIAGNOSIS — I48.0 PAROXYSMAL ATRIAL FIBRILLATION (MULTI): ICD-10-CM

## 2025-07-23 DIAGNOSIS — R00.2 PALPITATIONS: ICD-10-CM

## 2025-07-23 DIAGNOSIS — I47.10 SVT (SUPRAVENTRICULAR TACHYCARDIA): ICD-10-CM

## 2025-07-23 DIAGNOSIS — I48.91 ATRIAL FIBRILLATION, UNSPECIFIED TYPE (MULTI): ICD-10-CM

## 2025-07-23 DIAGNOSIS — G47.33 OSA (OBSTRUCTIVE SLEEP APNEA): ICD-10-CM

## 2025-07-23 DIAGNOSIS — G47.37 CENTRAL SLEEP APNEA ASSOCIATED WITH ATRIAL FIBRILLATION: ICD-10-CM

## 2025-07-23 PROCEDURE — 3430000001 HC RX 343 DIAGNOSTIC RADIOPHARMACEUTICALS: Performed by: STUDENT IN AN ORGANIZED HEALTH CARE EDUCATION/TRAINING PROGRAM

## 2025-07-23 PROCEDURE — 93018 CV STRESS TEST I&R ONLY: CPT | Performed by: INTERNAL MEDICINE

## 2025-07-23 PROCEDURE — 78452 HT MUSCLE IMAGE SPECT MULT: CPT | Performed by: STUDENT IN AN ORGANIZED HEALTH CARE EDUCATION/TRAINING PROGRAM

## 2025-07-23 PROCEDURE — 93017 CV STRESS TEST TRACING ONLY: CPT

## 2025-07-23 PROCEDURE — A9502 TC99M TETROFOSMIN: HCPCS | Performed by: STUDENT IN AN ORGANIZED HEALTH CARE EDUCATION/TRAINING PROGRAM

## 2025-07-23 PROCEDURE — 93016 CV STRESS TEST SUPVJ ONLY: CPT | Performed by: INTERNAL MEDICINE

## 2025-07-23 PROCEDURE — 78452 HT MUSCLE IMAGE SPECT MULT: CPT

## 2025-07-23 RX ADMIN — TETROFOSMIN 31.9 MILLICURIE: 0.23 INJECTION, POWDER, LYOPHILIZED, FOR SOLUTION INTRAVENOUS at 10:45

## 2025-07-23 RX ADMIN — TETROFOSMIN 10.5 MILLICURIE: 0.23 INJECTION, POWDER, LYOPHILIZED, FOR SOLUTION INTRAVENOUS at 09:34

## 2025-07-25 LAB
LABORATORY COMMENT REPORT: NORMAL
PATH REPORT.FINAL DX SPEC: NORMAL
PATH REPORT.GROSS SPEC: NORMAL
PATH REPORT.RELEVANT HX SPEC: NORMAL
PATH REPORT.TOTAL CANCER: NORMAL

## 2025-07-30 ENCOUNTER — APPOINTMENT (OUTPATIENT)
Dept: RADIOLOGY | Facility: HOSPITAL | Age: 60
End: 2025-07-30
Payer: COMMERCIAL

## 2025-08-05 ENCOUNTER — APPOINTMENT (OUTPATIENT)
Dept: CARDIOLOGY | Facility: CLINIC | Age: 60
End: 2025-08-05
Payer: COMMERCIAL

## 2025-08-28 ENCOUNTER — APPOINTMENT (OUTPATIENT)
Dept: CARDIOLOGY | Facility: CLINIC | Age: 60
End: 2025-08-28
Payer: COMMERCIAL

## 2025-11-05 ENCOUNTER — APPOINTMENT (OUTPATIENT)
Dept: SLEEP MEDICINE | Facility: CLINIC | Age: 60
End: 2025-11-05
Payer: COMMERCIAL